# Patient Record
Sex: MALE | Race: WHITE | Employment: OTHER | URBAN - METROPOLITAN AREA
[De-identification: names, ages, dates, MRNs, and addresses within clinical notes are randomized per-mention and may not be internally consistent; named-entity substitution may affect disease eponyms.]

---

## 2019-09-19 ENCOUNTER — HOSPITAL ENCOUNTER (INPATIENT)
Age: 49
LOS: 2 days | Discharge: HOME | End: 2019-09-21
Attending: EMERGENCY MEDICINE | Admitting: INTERNAL MEDICINE
Payer: COMMERCIAL

## 2019-09-19 DIAGNOSIS — F10.930 ALCOHOL WITHDRAWAL SYNDROME WITHOUT COMPLICATION (HCC): Primary | ICD-10-CM

## 2019-09-19 PROBLEM — F10.939 ALCOHOL WITHDRAWAL (HCC): Status: ACTIVE | Noted: 2019-09-19

## 2019-09-19 PROBLEM — Z72.0 TOBACCO USE: Status: ACTIVE | Noted: 2019-09-19

## 2019-09-19 PROBLEM — F10.931: Status: ACTIVE | Noted: 2019-09-19

## 2019-09-19 LAB
A/G RATIO: 1.5 (ref 1.1–2.2)
ALBUMIN SERPL-MCNC: 4.8 G/DL (ref 3.4–5)
ALP BLD-CCNC: 104 U/L (ref 40–129)
ALT SERPL-CCNC: 155 U/L (ref 10–40)
AMORPHOUS: ABNORMAL /HPF
ANION GAP SERPL CALCULATED.3IONS-SCNC: 14 MMOL/L (ref 3–16)
AST SERPL-CCNC: 170 U/L (ref 15–37)
BACTERIA: ABNORMAL /HPF
BASOPHILS ABSOLUTE: 0.1 K/UL (ref 0–0.2)
BASOPHILS RELATIVE PERCENT: 0.5 %
BILIRUB SERPL-MCNC: 0.3 MG/DL (ref 0–1)
BILIRUBIN URINE: NEGATIVE
BLOOD, URINE: ABNORMAL
BUN BLDV-MCNC: 15 MG/DL (ref 7–20)
CALCIUM SERPL-MCNC: 9.9 MG/DL (ref 8.3–10.6)
CHLORIDE BLD-SCNC: 97 MMOL/L (ref 99–110)
CLARITY: CLEAR
CO2: 25 MMOL/L (ref 21–32)
COLOR: YELLOW
CREAT SERPL-MCNC: <0.5 MG/DL (ref 0.9–1.3)
EOSINOPHILS ABSOLUTE: 0 K/UL (ref 0–0.6)
EOSINOPHILS RELATIVE PERCENT: 0.3 %
ETHANOL: NORMAL MG/DL (ref 0–0.08)
GFR AFRICAN AMERICAN: >60
GFR NON-AFRICAN AMERICAN: >60
GLOBULIN: 3.2 G/DL
GLUCOSE BLD-MCNC: 104 MG/DL (ref 70–99)
GLUCOSE URINE: NEGATIVE MG/DL
HCT VFR BLD CALC: 44.8 % (ref 40.5–52.5)
HEMOGLOBIN: 15.5 G/DL (ref 13.5–17.5)
KETONES, URINE: NEGATIVE MG/DL
LEUKOCYTE ESTERASE, URINE: NEGATIVE
LIPASE: 55 U/L (ref 13–60)
LYMPHOCYTES ABSOLUTE: 0.8 K/UL (ref 1–5.1)
LYMPHOCYTES RELATIVE PERCENT: 7.7 %
MCH RBC QN AUTO: 33.6 PG (ref 26–34)
MCHC RBC AUTO-ENTMCNC: 34.7 G/DL (ref 31–36)
MCV RBC AUTO: 97.1 FL (ref 80–100)
MICROSCOPIC EXAMINATION: YES
MONOCYTES ABSOLUTE: 1.4 K/UL (ref 0–1.3)
MONOCYTES RELATIVE PERCENT: 14.1 %
NEUTROPHILS ABSOLUTE: 7.6 K/UL (ref 1.7–7.7)
NEUTROPHILS RELATIVE PERCENT: 77.4 %
NITRITE, URINE: NEGATIVE
PDW BLD-RTO: 13.7 % (ref 12.4–15.4)
PH UA: 8.5 (ref 5–8)
PLATELET # BLD: 151 K/UL (ref 135–450)
PMV BLD AUTO: 7.7 FL (ref 5–10.5)
POTASSIUM SERPL-SCNC: 3.7 MMOL/L (ref 3.5–5.1)
PROTEIN UA: 100 MG/DL
RBC # BLD: 4.61 M/UL (ref 4.2–5.9)
RBC UA: ABNORMAL /HPF (ref 0–2)
SODIUM BLD-SCNC: 136 MMOL/L (ref 136–145)
SPECIFIC GRAVITY UA: 1.01 (ref 1–1.03)
TOTAL PROTEIN: 8 G/DL (ref 6.4–8.2)
URINE TYPE: ABNORMAL
UROBILINOGEN, URINE: 0.2 E.U./DL
WBC # BLD: 9.9 K/UL (ref 4–11)
WBC UA: ABNORMAL /HPF (ref 0–5)

## 2019-09-19 PROCEDURE — 2580000003 HC RX 258: Performed by: NURSE PRACTITIONER

## 2019-09-19 PROCEDURE — 96374 THER/PROPH/DIAG INJ IV PUSH: CPT

## 2019-09-19 PROCEDURE — 85025 COMPLETE CBC W/AUTO DIFF WBC: CPT

## 2019-09-19 PROCEDURE — 96361 HYDRATE IV INFUSION ADD-ON: CPT

## 2019-09-19 PROCEDURE — 1200000000 HC SEMI PRIVATE

## 2019-09-19 PROCEDURE — 93005 ELECTROCARDIOGRAM TRACING: CPT | Performed by: EMERGENCY MEDICINE

## 2019-09-19 PROCEDURE — 96376 TX/PRO/DX INJ SAME DRUG ADON: CPT

## 2019-09-19 PROCEDURE — 83690 ASSAY OF LIPASE: CPT

## 2019-09-19 PROCEDURE — 99291 CRITICAL CARE FIRST HOUR: CPT

## 2019-09-19 PROCEDURE — 80053 COMPREHEN METABOLIC PANEL: CPT

## 2019-09-19 PROCEDURE — 2580000003 HC RX 258: Performed by: EMERGENCY MEDICINE

## 2019-09-19 PROCEDURE — 6370000000 HC RX 637 (ALT 250 FOR IP): Performed by: NURSE PRACTITIONER

## 2019-09-19 PROCEDURE — 2500000003 HC RX 250 WO HCPCS: Performed by: EMERGENCY MEDICINE

## 2019-09-19 PROCEDURE — 81001 URINALYSIS AUTO W/SCOPE: CPT

## 2019-09-19 PROCEDURE — 96375 TX/PRO/DX INJ NEW DRUG ADDON: CPT

## 2019-09-19 PROCEDURE — 6360000002 HC RX W HCPCS: Performed by: EMERGENCY MEDICINE

## 2019-09-19 PROCEDURE — G0480 DRUG TEST DEF 1-7 CLASSES: HCPCS

## 2019-09-19 RX ORDER — LORAZEPAM 1 MG/1
2 TABLET ORAL
Status: DISCONTINUED | OUTPATIENT
Start: 2019-09-19 | End: 2019-09-21 | Stop reason: HOSPADM

## 2019-09-19 RX ORDER — LORAZEPAM 1 MG/1
3 TABLET ORAL
Status: DISCONTINUED | OUTPATIENT
Start: 2019-09-19 | End: 2019-09-21 | Stop reason: HOSPADM

## 2019-09-19 RX ORDER — LORAZEPAM 2 MG/ML
3 INJECTION INTRAMUSCULAR
Status: DISCONTINUED | OUTPATIENT
Start: 2019-09-19 | End: 2019-09-21 | Stop reason: HOSPADM

## 2019-09-19 RX ORDER — SODIUM CHLORIDE 0.9 % (FLUSH) 0.9 %
10 SYRINGE (ML) INJECTION EVERY 12 HOURS SCHEDULED
Status: DISCONTINUED | OUTPATIENT
Start: 2019-09-19 | End: 2019-09-21 | Stop reason: HOSPADM

## 2019-09-19 RX ORDER — LORAZEPAM 1 MG/1
1 TABLET ORAL
Status: DISCONTINUED | OUTPATIENT
Start: 2019-09-19 | End: 2019-09-21 | Stop reason: HOSPADM

## 2019-09-19 RX ORDER — CHLORDIAZEPOXIDE HYDROCHLORIDE 25 MG/1
25 CAPSULE, GELATIN COATED ORAL 3 TIMES DAILY
Status: DISCONTINUED | OUTPATIENT
Start: 2019-09-19 | End: 2019-09-21 | Stop reason: HOSPADM

## 2019-09-19 RX ORDER — NICOTINE 21 MG/24HR
1 PATCH, TRANSDERMAL 24 HOURS TRANSDERMAL DAILY
Status: DISCONTINUED | OUTPATIENT
Start: 2019-09-19 | End: 2019-09-21 | Stop reason: HOSPADM

## 2019-09-19 RX ORDER — CHOLECALCIFEROL (VITAMIN D3) 125 MCG
500 CAPSULE ORAL DAILY
Status: DISCONTINUED | OUTPATIENT
Start: 2019-09-20 | End: 2019-09-21 | Stop reason: HOSPADM

## 2019-09-19 RX ORDER — SODIUM CHLORIDE 0.9 % (FLUSH) 0.9 %
10 SYRINGE (ML) INJECTION PRN
Status: DISCONTINUED | OUTPATIENT
Start: 2019-09-19 | End: 2019-09-21 | Stop reason: HOSPADM

## 2019-09-19 RX ORDER — SODIUM CHLORIDE 9 MG/ML
INJECTION, SOLUTION INTRAVENOUS CONTINUOUS
Status: DISCONTINUED | OUTPATIENT
Start: 2019-09-19 | End: 2019-09-21 | Stop reason: HOSPADM

## 2019-09-19 RX ORDER — LORAZEPAM 2 MG/ML
2 INJECTION INTRAMUSCULAR
Status: DISCONTINUED | OUTPATIENT
Start: 2019-09-19 | End: 2019-09-21 | Stop reason: HOSPADM

## 2019-09-19 RX ORDER — LORAZEPAM 2 MG/ML
4 INJECTION INTRAMUSCULAR
Status: DISCONTINUED | OUTPATIENT
Start: 2019-09-19 | End: 2019-09-21 | Stop reason: HOSPADM

## 2019-09-19 RX ORDER — LORAZEPAM 1 MG/1
4 TABLET ORAL
Status: DISCONTINUED | OUTPATIENT
Start: 2019-09-19 | End: 2019-09-21 | Stop reason: HOSPADM

## 2019-09-19 RX ORDER — NICOTINE 21 MG/24HR
1 PATCH, TRANSDERMAL 24 HOURS TRANSDERMAL DAILY
Status: DISCONTINUED | OUTPATIENT
Start: 2019-09-20 | End: 2019-09-19

## 2019-09-19 RX ORDER — ACETAMINOPHEN 325 MG/1
650 TABLET ORAL EVERY 4 HOURS PRN
Status: DISCONTINUED | OUTPATIENT
Start: 2019-09-19 | End: 2019-09-21 | Stop reason: HOSPADM

## 2019-09-19 RX ORDER — FOLIC ACID 1 MG/1
1 TABLET ORAL DAILY
Status: DISCONTINUED | OUTPATIENT
Start: 2019-09-20 | End: 2019-09-21 | Stop reason: HOSPADM

## 2019-09-19 RX ORDER — LORAZEPAM 2 MG/ML
1 INJECTION INTRAMUSCULAR
Status: DISCONTINUED | OUTPATIENT
Start: 2019-09-19 | End: 2019-09-21 | Stop reason: HOSPADM

## 2019-09-19 RX ORDER — ONDANSETRON 2 MG/ML
4 INJECTION INTRAMUSCULAR; INTRAVENOUS ONCE
Status: COMPLETED | OUTPATIENT
Start: 2019-09-19 | End: 2019-09-19

## 2019-09-19 RX ORDER — M-VIT,TX,IRON,MINS/CALC/FOLIC 27MG-0.4MG
1 TABLET ORAL DAILY
Status: DISCONTINUED | OUTPATIENT
Start: 2019-09-20 | End: 2019-09-21 | Stop reason: HOSPADM

## 2019-09-19 RX ORDER — ONDANSETRON 2 MG/ML
4 INJECTION INTRAMUSCULAR; INTRAVENOUS EVERY 6 HOURS PRN
Status: DISCONTINUED | OUTPATIENT
Start: 2019-09-19 | End: 2019-09-21 | Stop reason: HOSPADM

## 2019-09-19 RX ORDER — 0.9 % SODIUM CHLORIDE 0.9 %
500 INTRAVENOUS SOLUTION INTRAVENOUS ONCE
Status: COMPLETED | OUTPATIENT
Start: 2019-09-19 | End: 2019-09-19

## 2019-09-19 RX ADMIN — CHLORDIAZEPOXIDE HYDROCHLORIDE 25 MG: 25 CAPSULE ORAL at 22:06

## 2019-09-19 RX ADMIN — NICOTINE POLACRILEX 4 MG: 4 GUM, CHEWING BUCCAL at 22:45

## 2019-09-19 RX ADMIN — Medication 10 ML: at 22:06

## 2019-09-19 RX ADMIN — ONDANSETRON 4 MG: 2 INJECTION INTRAMUSCULAR; INTRAVENOUS at 17:40

## 2019-09-19 RX ADMIN — FOLIC ACID: 5 INJECTION, SOLUTION INTRAMUSCULAR; INTRAVENOUS; SUBCUTANEOUS at 18:47

## 2019-09-19 RX ADMIN — SODIUM CHLORIDE: 9 INJECTION, SOLUTION INTRAVENOUS at 22:06

## 2019-09-19 RX ADMIN — SODIUM CHLORIDE 500 ML: 9 INJECTION, SOLUTION INTRAVENOUS at 17:39

## 2019-09-19 RX ADMIN — LORAZEPAM 1 MG: 2 INJECTION INTRAMUSCULAR; INTRAVENOUS at 18:52

## 2019-09-19 RX ADMIN — LORAZEPAM 4 MG: 2 INJECTION INTRAMUSCULAR; INTRAVENOUS at 17:39

## 2019-09-19 ASSESSMENT — PATIENT HEALTH QUESTIONNAIRE - PHQ9: SUM OF ALL RESPONSES TO PHQ QUESTIONS 1-9: 22

## 2019-09-19 ASSESSMENT — PAIN SCALES - GENERAL
PAINLEVEL_OUTOF10: 3
PAINLEVEL_OUTOF10: 4

## 2019-09-19 ASSESSMENT — PAIN DESCRIPTION - DESCRIPTORS: DESCRIPTORS: CONSTANT;NAGGING;STABBING

## 2019-09-19 ASSESSMENT — ENCOUNTER SYMPTOMS
BACK PAIN: 1
VOMITING: 1
ABDOMINAL PAIN: 0
NAUSEA: 1
SHORTNESS OF BREATH: 0
COLOR CHANGE: 0

## 2019-09-19 ASSESSMENT — PAIN DESCRIPTION - ORIENTATION: ORIENTATION: LOWER;LEFT

## 2019-09-19 ASSESSMENT — PAIN DESCRIPTION - LOCATION: LOCATION: NECK;BACK;LEG

## 2019-09-19 ASSESSMENT — PAIN DESCRIPTION - PAIN TYPE: TYPE: CHRONIC PAIN

## 2019-09-19 NOTE — H&P
Recent Labs     09/19/19  1654      K 3.7   CL 97*   CO2 25   BUN 15   CREATININE <0.5*   CALCIUM 9.9     Recent Labs     09/19/19  1654   *   *   BILITOT 0.3   ALKPHOS 104     Urinalysis:      Lab Results   Component Value Date    NITRU Negative 09/19/2019    WBCUA 0-2 09/19/2019    BACTERIA 1+ 09/19/2019    RBCUA None seen 09/19/2019    BLOODU TRACE-INTACT 09/19/2019    SPECGRAV 1.015 09/19/2019    GLUCOSEU Negative 09/19/2019     Radiology:     CXR: I have reviewed the CXR with the following interpretation: N/A    EKG:  I have reviewed the EKG with the following interpretation: Sinus tachycardia, 156 with frequent Premature ventricular complexes. otherwise normal ECG. When compared with ECG of 16-DEC-2013 13:02, Premature ventricular complexes are now Present. Vent.  rate has increased BY  36 BPM    No orders to display     ASSESSMENT:    Active Hospital Problems    Diagnosis Date Noted    Tobacco use [Z72.0] 09/19/2019    Alcohol withdrawal ([address] [F10.239] 09/19/2019     PLAN:    Elective alcohol withdrawal in patient with known alcohol abuse  -Patient states he drinks  -Last drink was  -Alcohol level nondetected  -Received Ativan in ED  -Received banana bag in ED  -500 mL normal saline bolus given in ED  -CIWA protocol initiated in ED and continued  -Fall precautions  -Seizure precautions  -Telemetry monitoring  -Multivitamin, folic acid, and A82 daily  -Maintenance IV fluid  -Case management consult- alcohol rehab-thank you  -Librium initiated    Tobacco use  -Tobacco cessation  -Nicotine patch if needed    Acute transaminitis   -Pt with elevated /  -Secondary to alcohol use  -will trend  -hepatitis panel is pending    DVT Prophylaxis: Lovenox    Diet: No diet orders on file     Code Status: No Order    PT/OT Eval Status: No indication for need at this time    Dispo -2 to 3 days pending clinical improvement     Cathleen Lazo, APRN - CNP    Thank you No primary care

## 2019-09-19 NOTE — ED PROVIDER NOTES
[address] ENCOUNTER        Pt Name: Melba Draper  MRN: [id number]  [place] 1970  Date of evaluation: 9/19/2019  Provider: Malcolm Garcia MD  PCP: No primary care provider on file. CHIEF COMPLAINT       Chief Complaint   Patient presents with    Alcohol Problem     Patient states hes in Alcohol withdrawl, went to the recovery center today for alcohol assesment and was instructed to come to the ED for detox       HISTORY OFPRESENT ILLNESS   (Location/Symptom, Timing/Onset, Context/Setting, Quality, Duration, Modifying Factors,Severity)  Note limiting factors. Melba Draper is a 52 y.o. male presenting today due to concern for being an alcoholic but wanting to get help. His wife and daughter convinced him to come to the emergency department today after he initially went to outpatient rehabilitation but was told he was unsafe to be seen as an outpatient at this time. He had a small amount of alcohol this morning but states the last time he drank his normal amount was yesterday. He has went through serious alcohol withdrawal in the past.  No seizure-like activity. He denies any chest pain or abdominal pain. He is very tremulous with vomiting at home. He denies any hallucinations. No falls or trauma. He denies any headache or neck pain. He has chronic left leg sciatica with numbness to the left leg over the last month worsening but no new changes today. REVIEW OF SYSTEMS    (2-9 systems for level 4, 10 or more for level 5)     Review of Systems   Constitutional: Positive for fatigue. Negative for chills and fever. HENT: Negative for congestion. Respiratory: Negative for shortness of breath. Cardiovascular: Negative for chest pain. Gastrointestinal: Positive for nausea and vomiting. Negative for abdominal pain. Genitourinary: Negative for flank pain. Musculoskeletal: Positive for back pain (chronic low back).    Skin:

## 2019-09-20 LAB
ALBUMIN SERPL-MCNC: 3.9 G/DL (ref 3.4–5)
ALP BLD-CCNC: 85 U/L (ref 40–129)
ALT SERPL-CCNC: 102 U/L (ref 10–40)
ANION GAP SERPL CALCULATED.3IONS-SCNC: 10 MMOL/L (ref 3–16)
AST SERPL-CCNC: 101 U/L (ref 15–37)
BASOPHILS ABSOLUTE: 0 K/UL (ref 0–0.2)
BASOPHILS RELATIVE PERCENT: 0.4 %
BILIRUB SERPL-MCNC: 0.4 MG/DL (ref 0–1)
BILIRUBIN DIRECT: <0.2 MG/DL (ref 0–0.3)
BILIRUBIN, INDIRECT: ABNORMAL MG/DL (ref 0–1)
BUN BLDV-MCNC: 13 MG/DL (ref 7–20)
CALCIUM SERPL-MCNC: 9.1 MG/DL (ref 8.3–10.6)
CHLORIDE BLD-SCNC: 104 MMOL/L (ref 99–110)
CO2: 25 MMOL/L (ref 21–32)
CREAT SERPL-MCNC: 0.6 MG/DL (ref 0.9–1.3)
EKG ATRIAL RATE: 156 BPM
EKG DIAGNOSIS: NORMAL
EKG P AXIS: 70 DEGREES
EKG P-R INTERVAL: 140 MS
EKG Q-T INTERVAL: 366 MS
EKG QRS DURATION: 94 MS
EKG QTC CALCULATION (BAZETT): 495 MS
EKG R AXIS: 71 DEGREES
EKG T AXIS: 62 DEGREES
EKG VENTRICULAR RATE: 110 BPM
EOSINOPHILS ABSOLUTE: 0.1 K/UL (ref 0–0.6)
EOSINOPHILS RELATIVE PERCENT: 1.6 %
GFR AFRICAN AMERICAN: >60
GFR NON-AFRICAN AMERICAN: >60
GLUCOSE BLD-MCNC: 91 MG/DL (ref 70–99)
HAV IGM SER IA-ACNC: NORMAL
HCT VFR BLD CALC: 39.1 % (ref 40.5–52.5)
HEMOGLOBIN: 13.4 G/DL (ref 13.5–17.5)
HEPATITIS B CORE IGM ANTIBODY: NORMAL
HEPATITIS B SURFACE ANTIGEN INTERPRETATION: NORMAL
HEPATITIS C ANTIBODY INTERPRETATION: NORMAL
LYMPHOCYTES ABSOLUTE: 1.1 K/UL (ref 1–5.1)
LYMPHOCYTES RELATIVE PERCENT: 14.5 %
MCH RBC QN AUTO: 33.8 PG (ref 26–34)
MCHC RBC AUTO-ENTMCNC: 34.3 G/DL (ref 31–36)
MCV RBC AUTO: 98.3 FL (ref 80–100)
MONOCYTES ABSOLUTE: 1.1 K/UL (ref 0–1.3)
MONOCYTES RELATIVE PERCENT: 15 %
NEUTROPHILS ABSOLUTE: 5.2 K/UL (ref 1.7–7.7)
NEUTROPHILS RELATIVE PERCENT: 68.5 %
PDW BLD-RTO: 13.4 % (ref 12.4–15.4)
PLATELET # BLD: 123 K/UL (ref 135–450)
PMV BLD AUTO: 8.4 FL (ref 5–10.5)
POTASSIUM REFLEX MAGNESIUM: 3.9 MMOL/L (ref 3.5–5.1)
RBC # BLD: 3.97 M/UL (ref 4.2–5.9)
SODIUM BLD-SCNC: 139 MMOL/L (ref 136–145)
TOTAL PROTEIN: 6.4 G/DL (ref 6.4–8.2)
WBC # BLD: 7.6 K/UL (ref 4–11)

## 2019-09-20 PROCEDURE — 80076 HEPATIC FUNCTION PANEL: CPT

## 2019-09-20 PROCEDURE — 6360000002 HC RX W HCPCS: Performed by: EMERGENCY MEDICINE

## 2019-09-20 PROCEDURE — 93010 ELECTROCARDIOGRAM REPORT: CPT | Performed by: INTERNAL MEDICINE

## 2019-09-20 PROCEDURE — 85025 COMPLETE CBC W/AUTO DIFF WBC: CPT

## 2019-09-20 PROCEDURE — 36415 COLL VENOUS BLD VENIPUNCTURE: CPT

## 2019-09-20 PROCEDURE — 2580000003 HC RX 258: Performed by: NURSE PRACTITIONER

## 2019-09-20 PROCEDURE — 6360000002 HC RX W HCPCS: Performed by: NURSE PRACTITIONER

## 2019-09-20 PROCEDURE — 6370000000 HC RX 637 (ALT 250 FOR IP): Performed by: NURSE PRACTITIONER

## 2019-09-20 PROCEDURE — 2500000003 HC RX 250 WO HCPCS: Performed by: EMERGENCY MEDICINE

## 2019-09-20 PROCEDURE — 1200000000 HC SEMI PRIVATE

## 2019-09-20 PROCEDURE — 80074 ACUTE HEPATITIS PANEL: CPT

## 2019-09-20 PROCEDURE — 2580000003 HC RX 258: Performed by: EMERGENCY MEDICINE

## 2019-09-20 PROCEDURE — 80048 BASIC METABOLIC PNL TOTAL CA: CPT

## 2019-09-20 RX ADMIN — CHLORDIAZEPOXIDE HYDROCHLORIDE 25 MG: 25 CAPSULE ORAL at 15:19

## 2019-09-20 RX ADMIN — FOLIC ACID 1 MG: 1 TABLET ORAL at 09:07

## 2019-09-20 RX ADMIN — CHLORDIAZEPOXIDE HYDROCHLORIDE 25 MG: 25 CAPSULE ORAL at 20:45

## 2019-09-20 RX ADMIN — CYANOCOBALAMIN TAB 500 MCG 500 MCG: 500 TAB at 09:07

## 2019-09-20 RX ADMIN — ENOXAPARIN SODIUM 40 MG: 40 INJECTION SUBCUTANEOUS at 09:07

## 2019-09-20 RX ADMIN — CHLORDIAZEPOXIDE HYDROCHLORIDE 25 MG: 25 CAPSULE ORAL at 09:06

## 2019-09-20 RX ADMIN — FOLIC ACID: 5 INJECTION, SOLUTION INTRAMUSCULAR; INTRAVENOUS; SUBCUTANEOUS at 10:11

## 2019-09-20 RX ADMIN — Medication 1 TABLET: at 09:06

## 2019-09-20 RX ADMIN — LORAZEPAM 1 MG: 1 TABLET ORAL at 02:09

## 2019-09-20 RX ADMIN — Medication 10 ML: at 09:07

## 2019-09-20 NOTE — PROGRESS NOTES
Pt admitted to  [facility] with wife at bedside in stable condition via wheelchair. Pt has very slight h/a, slight tremor that can be seen, denies n/v, slightly sweating on nose, denies any type of hallucinations. Scoring 6 on CIWA. Oriented to room, call light and POC. Bed locked and in lowest position. Call light and bedside table within reach. Will continue to monitor.

## 2019-09-21 VITALS
HEIGHT: 75 IN | RESPIRATION RATE: 16 BRPM | BODY MASS INDEX: 18.5 KG/M2 | DIASTOLIC BLOOD PRESSURE: 93 MMHG | TEMPERATURE: 97.6 F | HEART RATE: 76 BPM | WEIGHT: 148.8 LBS | SYSTOLIC BLOOD PRESSURE: 158 MMHG | OXYGEN SATURATION: 96 %

## 2019-09-21 LAB
ALBUMIN SERPL-MCNC: 3.9 G/DL (ref 3.4–5)
ALP BLD-CCNC: 82 U/L (ref 40–129)
ALT SERPL-CCNC: 85 U/L (ref 10–40)
AST SERPL-CCNC: 84 U/L (ref 15–37)
BILIRUB SERPL-MCNC: 0.4 MG/DL (ref 0–1)
BILIRUBIN DIRECT: <0.2 MG/DL (ref 0–0.3)
BILIRUBIN, INDIRECT: ABNORMAL MG/DL (ref 0–1)
TOTAL PROTEIN: 6.5 G/DL (ref 6.4–8.2)

## 2019-09-21 PROCEDURE — 6360000002 HC RX W HCPCS: Performed by: EMERGENCY MEDICINE

## 2019-09-21 PROCEDURE — 2580000003 HC RX 258: Performed by: EMERGENCY MEDICINE

## 2019-09-21 PROCEDURE — 36415 COLL VENOUS BLD VENIPUNCTURE: CPT

## 2019-09-21 PROCEDURE — 2500000003 HC RX 250 WO HCPCS: Performed by: EMERGENCY MEDICINE

## 2019-09-21 PROCEDURE — 6370000000 HC RX 637 (ALT 250 FOR IP): Performed by: NURSE PRACTITIONER

## 2019-09-21 PROCEDURE — 6360000002 HC RX W HCPCS: Performed by: NURSE PRACTITIONER

## 2019-09-21 PROCEDURE — 80076 HEPATIC FUNCTION PANEL: CPT

## 2019-09-21 RX ORDER — CHLORDIAZEPOXIDE HYDROCHLORIDE 25 MG/1
25 CAPSULE, GELATIN COATED ORAL 3 TIMES DAILY PRN
Qty: 15 CAPSULE | Refills: 0 | Status: SHIPPED | OUTPATIENT
Start: 2019-09-21 | End: 2019-09-26

## 2019-09-21 RX ORDER — FOLIC ACID 1 MG/1
1 TABLET ORAL DAILY
Qty: 30 TABLET | Refills: 3 | Status: SHIPPED | OUTPATIENT
Start: 2019-09-22 | End: 2020-02-07

## 2019-09-21 RX ORDER — THIAMINE MONONITRATE (VIT B1) 100 MG
100 TABLET ORAL DAILY
Qty: 30 TABLET | Refills: 3 | Status: SHIPPED | OUTPATIENT
Start: 2019-09-21 | End: 2020-02-07

## 2019-09-21 RX ADMIN — Medication 1 TABLET: at 09:18

## 2019-09-21 RX ADMIN — LORAZEPAM 1 MG: 1 TABLET ORAL at 01:59

## 2019-09-21 RX ADMIN — CHLORDIAZEPOXIDE HYDROCHLORIDE 25 MG: 25 CAPSULE ORAL at 09:18

## 2019-09-21 RX ADMIN — ENOXAPARIN SODIUM 40 MG: 40 INJECTION SUBCUTANEOUS at 09:19

## 2019-09-21 RX ADMIN — FOLIC ACID 1 MG: 1 TABLET ORAL at 09:18

## 2019-09-21 RX ADMIN — FOLIC ACID: 5 INJECTION, SOLUTION INTRAMUSCULAR; INTRAVENOUS; SUBCUTANEOUS at 09:18

## 2019-09-21 RX ADMIN — CYANOCOBALAMIN TAB 500 MCG 500 MCG: 500 TAB at 09:18

## 2019-09-21 NOTE — PROGRESS NOTES
Pt D/C to home per order. PIV removed. D/C instructions reviewed. Verbalized understanding. Pt taken off floor via wheelchair in stable condition.

## 2020-02-07 ENCOUNTER — OFFICE VISIT (OUTPATIENT)
Dept: ORTHOPEDIC SURGERY | Age: 50
End: 2020-02-07
Payer: COMMERCIAL

## 2020-02-07 VITALS
HEART RATE: 82 BPM | SYSTOLIC BLOOD PRESSURE: 144 MMHG | HEIGHT: 75 IN | DIASTOLIC BLOOD PRESSURE: 88 MMHG | WEIGHT: 148.81 LBS | BODY MASS INDEX: 18.5 KG/M2

## 2020-02-07 PROCEDURE — 4004F PT TOBACCO SCREEN RCVD TLK: CPT | Performed by: PHYSICAL MEDICINE & REHABILITATION

## 2020-02-07 PROCEDURE — G8427 DOCREV CUR MEDS BY ELIG CLIN: HCPCS | Performed by: PHYSICAL MEDICINE & REHABILITATION

## 2020-02-07 PROCEDURE — 3017F COLORECTAL CA SCREEN DOC REV: CPT | Performed by: PHYSICAL MEDICINE & REHABILITATION

## 2020-02-07 PROCEDURE — G8420 CALC BMI NORM PARAMETERS: HCPCS | Performed by: PHYSICAL MEDICINE & REHABILITATION

## 2020-02-07 PROCEDURE — 99204 OFFICE O/P NEW MOD 45 MIN: CPT | Performed by: PHYSICAL MEDICINE & REHABILITATION

## 2020-02-07 PROCEDURE — G8484 FLU IMMUNIZE NO ADMIN: HCPCS | Performed by: PHYSICAL MEDICINE & REHABILITATION

## 2020-02-07 RX ORDER — PREDNISONE 10 MG/1
TABLET ORAL
Qty: 26 TABLET | Refills: 0 | Status: SHIPPED | OUTPATIENT
Start: 2020-02-07 | End: 2020-06-27

## 2020-02-07 NOTE — PROGRESS NOTES
Current Medications:   No current outpatient medications on file. Allergies:  Patient has no known allergies. Social History:    reports that he has been smoking. He has been smoking about 1.00 pack per day. He has never used smokeless tobacco. He reports current alcohol use of about 14.0 standard drinks of alcohol per week. He reports current drug use. Drug: Marijuana. Family History:   No family history on file. REVIEW OF SYSTEMS: Full ROS noted & scanned   CONSTITUTIONAL: Denies unexplained weight loss, fevers, chills or fatigue  NEUROLOGICAL: Denies unsteady gait or progressive weakness  MUSCULOSKELETAL: Denies joint swelling or redness  PSYCHOLOGICAL: Denies anxiety, depression   SKIN: Denies skin changes, delayed healing, rash, itching   HEMATOLOGIC: Denies easy bleeding or bruising  ENDOCRINE: Denies excessive thirst, urination, heat/cold  RESPIRATORY: Denies current dyspnea, cough  GI: Denies nausea, vomiting, diarrhea   : Denies bowel or bladder issues       PHYSICAL EXAM:    Vitals: Blood pressure (!) 144/88, pulse 82, height 6' 3\" (1.905 m), weight 148 lb 13 oz (67.5 kg). GENERAL EXAM:  · General Apparence: Patient is adequately groomed with no evidence of malnutrition. · Orientation: The patient is oriented to time, place and person. · Mood & Affect:The patient's mood and affect are appropriate   · Vascular: Examination reveals no swelling tenderness in upper or lower extremities. Good capillary refill  · Lymphatic: The lymphatic examination bilaterally reveals all areas to be without enlargement or induration  · Sensation: Sensation is intact without deficit  · Coordination/Balance: Good coordination     CERVICAL EXAMINATION:  · Inspection: Local inspection shows no step-off or bruising. Cervical alignment is normal.     · Palpation: No evidence of tenderness at the midline, and trapezius. Paraspinal tenderness is present. There is no step-off or paraspinal spasm.    · Range of

## 2020-02-12 ENCOUNTER — HOSPITAL ENCOUNTER (OUTPATIENT)
Dept: PHYSICAL THERAPY | Age: 50
Setting detail: THERAPIES SERIES
Discharge: HOME | End: 2020-02-12
Payer: COMMERCIAL

## 2020-02-19 ENCOUNTER — HOSPITAL ENCOUNTER (OUTPATIENT)
Dept: PHYSICAL THERAPY | Age: 50
Setting detail: THERAPIES SERIES
Discharge: HOME | End: 2020-02-19
Payer: COMMERCIAL

## 2020-02-19 PROCEDURE — G0283 ELEC STIM OTHER THAN WOUND: HCPCS

## 2020-02-19 PROCEDURE — 97161 PT EVAL LOW COMPLEX 20 MIN: CPT

## 2020-02-19 PROCEDURE — 97110 THERAPEUTIC EXERCISES: CPT

## 2020-02-19 NOTE — PLAN OF CARE
[facility], [address]  Phone: [phone]  Fax [phone]    Physical Therapy Certification    Dear Referring Practitioner: Dr. Angelika Flores,    We had the pleasure of evaluating the following patient for physical therapy services at [address] [place] [address]. A summary of our findings can be found in the initial assessment below. This includes our plan of care. If you have any questions or concerns regarding these findings, please do not hesitate to contact me at the office phone number checked above. Thank you for the referral.       Physician Signature:_______________________________Date:__________________  By signing above (or electronic signature), therapists plan is approved by physician    Patient: Alivia Jacobs   : 1970   MRN: [id number]  Referring Physician: Referring Practitioner: Dr. Angelika Flores      Evaluation Date: 2020      Medical Diagnosis Information:  Diagnosis: Spinal stenosis of lumbar region w/ neurogenic claudication (M48.062)   Treatment Diagnosis: Lumbar pain (M54.5)                                         Insurance information: PT Insurance Information: [employer]       Precautions/ Contra-indications: hx cervical pain  Latex Allergy:  [x]NO      []YES  Preferred Language for Healthcare:   [x]English       []other:    SUBJECTIVE: Patient stated complaint: Pt reports his back has been bothering him about 2-3 years with no specific BILLIE. Pt reports he gets numbness down both legs, stabbing pain in calves and left glutes. States he did just finish a steroid pack which helped with the pain. 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     Relevant Medical History: hx cervical pain  Functional Disability Index/G-Codes:   Modified Oswestry 36%    Height: 6'3 Weight: 155 lb  Pain Scale: 0-6/10  Easing factors: heat, steroid pack, sitting  Provocative factors: standing, walking, lifting, overhead movement, lifting    Type: []Constant   [x]Intermittent  [x]Radiating []Localized []other:     Numbness/Tingling: bilateral legs, worsened after lifting activities    Occupation/School: construction; enjoys walking, tennis and skiing    Living Status/Prior Level of Function: Independent with ADLs  OBJECTIVE:     ROM     LUMBAR FLEX 90 deg    LUMBAR EXT 20 deg    Sidebend To knee joint B    Rotation      LEFT RIGHT   HIP Flex     HIP Abd     HIP ER     HIP IR     Knee Flex     Knee ext     Hamstring FLEX ~60 deg SLR ~50 deg SLR   Piriformis                Strength  LEFT RIGHT   MfA     TrA     HIP Flexors 4/5 4+/5   HIP Abductors     HIP ER 4-/5 4/5   Hip IR 4-/5 4+/5   Knee EXT (quad) 4/5 4+/5   Knee Flex (HS) 4-/5 4+/5   Ankle DF     Ankle PF     Great Toe Ext       Reflexes/Sensation:    []Dermatomes/Myotomes intact    []UE Reflexes     []Normal []Hypo      []Hyper   [x]LE Reflexes     [x]Normal []Hypo      []Hyper   []Babinski/Clonus/Hoffmans:    []Other:    Joint mobility:   []Normal    [x]Hypo   []Hyper    Palpation: palpable increased tightness along L2-4 paraspinals on the L side. Stiff w/ PA mobs and sacral mobs    Functional Mobility/Transfers: no deficits    Posture: stands with knees flexed-reports he does this to be in a \"yoga ready posture\" to take pressure off of his back    Bandages/Dressings/Incisions: n/a    Gait: (include devices/WB status) no gait deviations    Orthopedic Special Tests: none                       [x] Patient history, allergies, meds reviewed. Medical chart reviewed. See intake form. Review Of Systems (ROS):  [x]Performed Review of systems (Integumentary, CardioPulmonary, Neurological) by intake and observation. Intake form has been scanned into medical record.  Patient has been instructed to contact their primary care dermatome dysfunction   []Signs/symptoms consistent with post-surgical status including: decreased ROM, strength and function. [x]signs/symptoms consistent with pathology which may benefit from Dry needling     []other:      Prognosis/Rehab Potential:      []Excellent   []Good    [x]Fair   []Poor    Tolerance of evaluation/treatment:    []Excellent   [x]Good    []Fair   []Poor  Physical Therapy Evaluation Complexity Justification  [x] A history of present problem with:  [] no personal factors and/or comorbidities that impact the plan of care;  [x]1-2 personal factors and/or comorbidities that impact the plan of care  []3 personal factors and/or comorbidities that impact the plan of care  [x] An examination of body systems using standardized tests and measures addressing any of the following: body structures and functions (impairments), activity limitations, and/or participation restrictions;:  [x] a total of 1-2 or more elements   [] a total of 3 or more elements   [] a total of 4 or more elements   [x] A clinical presentation with:  [] stable and/or uncomplicated characteristics   [x] evolving clinical presentation with changing characteristics  [] unstable and unpredictable characteristics;   [x] Clinical decision making of [x] low, [] moderate, [] high complexity using standardized patient assessment instrument and/or measurable assessment of functional outcome.     [x] EVAL (LOW) 58280 (typically 20 minutes face-to-face)  [] EVAL (MOD) 37822 (typically 30 minutes face-to-face)  [] EVAL (HIGH) 74769 (typically 45 minutes face-to-face)  [] RE-EVAL         PLAN: Begin PT focusing on: proximal hip mobilizations, LB mobs, LB core activation, proximal hip activation, and HEP    Frequency/Duration: 1-2 days per week for 6-8 Weeks:  Interventions:  [x]  Therapeutic exercise including: strength training, ROM, for LE, Glutes and core   [x]  NMR activation and proprioception for glutes , LE and Core   [x]  Manual therapy signed by:  Nita Ya, [facility] ,DPT [id number]

## 2020-02-19 NOTE — FLOWSHEET NOTE
[facility],  [address] [place]  Phone: [phone]  Fax [phone]    Physical Therapy Treatment Note/ Progress Report:           Date:  2020    Patient Name:  Marcial Morales  \"KARRIE\"  :  1970  MRN: [id number]  Restrictions/Precautions:    Medical/Treatment Diagnosis Information:  · Diagnosis: Spinal stenosis of lumbar region w/ neurogenic claudication (M48.062)  · Treatment Diagnosis: Lumbar pain (M95.8)  Insurance/Certification information:  PT Insurance Information: [facility]  Physician Information:  Referring Practitioner: Dr. Carlita Harris  Has the plan of care been signed (Y/N):        []  Yes  [x]  No     Date of Patient follow up with Physician:       Is this a Progress Report:     []  Yes  [x]  No        If Yes:  Date Range for reporting period:  Beginning  Ending    Progress report will be due (10 Rx or 30 days whichever is less):        Recertification will be due (POC Duration  / 90 days whichever is less): 20        Visit # Insurance Allowable Auth Required   1 30 [x]  Yes []  No   If >30 visits       Functional Scale: Modified Oswestry 36%    Date assessed:  20     Therapy Diagnosis/Practice Pattern:F      Number of Comorbidities:  []0     [x]1-2    []3+    Latex Allergy:  [x]NO      []YES  Preferred Language for Healthcare:   [x]English       []other:      Pain level: 1-6/10    SUBJECTIVE:  See eval    OBJECTIVE: See eval   Observation:    Test measurements:      RESTRICTIONS/PRECAUTIONS: hx cervical pain    Exercises/Interventions:     Therapeutic Ex (65244) Sets/sec Reps Notes/CUES   Fig 4 stretch B 30\" 3 HEp   HL TA  5\" 20x HEP, tactile cues, cues for syncing with breathing   LTR 5\" 10x R/L HEP   SLR flex B 1 10 Fatigue; HEP   HS stretch at stairs B 30\" 3 HEP   Standing hip ABD B 1 10 Cues for upright position and no trunk lean; HEP                           Pt ed 10'  HEP, POC, posture for standing, importance of core activation, heat, potential traction         Manual Intervention (16251)      STM lumbar paraspinals/glutes  Hip ER stretch B 4'  Tight L glutes                                 NMR re-education (48924)   CUES NEEDED                                                         Therapeutic Activity (69754)                                              Therapeutic Exercise and NMR EXR  [x] (53625) Provided verbal/tactile cueing for activities related to strengthening, flexibility, endurance, ROM  for improvements in proximal hip and core control with self care, mobility, lifting and ambulation.  [] (63789) Provided verbal/tactile cueing for activities related to improving balance, coordination, kinesthetic sense, posture, motor skill, proprioception  to assist with core control in self care, mobility, lifting, and ambulation.      Therapeutic Activities:    [] (08712 or 16610) Provided verbal/tactile cueing for activities related to improving balance, coordination, kinesthetic sense, posture, motor skill, proprioception and motor activation to allow for proper function  with self care and ADLs  [] (43774) Provided training and instruction to the patient for proper core and proximal hip recruitment and positioning with ambulation re-education     Home Exercise Program:    [x] (12654) Reviewed/Progressed HEP activities related to strengthening, flexibility, endurance, ROM of core, proximal hip and LE for functional self-care, mobility, lifting and ambulation   [] (72939) Reviewed/Progressed HEP activities related to improving balance, coordination, kinesthetic sense, posture, motor skill, proprioception of core, proximal hip and LE for self care, mobility, lifting, and ambulation      Manual Treatments:  PROM / STM / Oscillations-Mobs:  G-I, II, III, IV (PA's, Inf., Post.)  [x] (10610) Provided manual therapy to mobilize proximal hip and LS spine soft tissue/joints for the purpose of modulating pain, promoting relaxation,  increasing ROM, reducing/eliminating soft tissue swelling/inflammation/restriction, improving soft tissue extensibility and allowing for proper ROM for normal function with self care, mobility, lifting and ambulation. Modalities: PM/MHP in prone 15' along belt line      Charges:  Timed Code Treatment Minutes: 25   Total Treatment Minutes: 65     [x] EVAL (LOW) 10969 (typically 20 minutes face-to-face)  [] EVAL (MOD) 85735 (typically 30 minutes face-to-face)  [] EVAL (HIGH) 71639 (typically 45 minutes face-to-face)  [] RE-EVAL     [x] QM(01185) x 2    [] IONTO  [] NMR (94575) x     [] VASO  [] Manual (93612) x      [] Other:  [] TA x      [] Mech Traction (76123)  [] ES(attended) (44152)      [x] ES (un) (15859):     Goals:   Patient stated goal: \"Less pain, more mobility\"  [] Progressing: [] Met: [] Not Met: [] Adjusted    Therapist goals for Patient:   Short Term Goals: To be achieved in: 2 weeks  1. Independent in HEP and progression per patient tolerance, in order to prevent re-injury. [] Progressing: [] Met: [] Not Met: [] Adjusted  2. Patient will have a decrease in pain to facilitate improvement in movement, function, and ADLs as indicated by Functional Deficits. [] Progressing: [] Met: [] Not Met: [] Adjusted      Long Term Goals: To be achieved in: 8 weeks  1. Disability index score of 18% or less for the Modified Oswestry  to assist with reaching prior level of function. [] Progressing: [] Met: [] Not Met: [] Adjusted  2. Patient will demonstrate increased hamstring flexibility to ~80 deg SLR B to allow for proper joint functioning as indicated by patients Functional Deficits. [] Progressing: [] Met: [] Not Met: [] Adjusted  3. Patient will demonstrate an increase in Strength to 4+/5 hip flex on L and 4+/5 hip ABD B to allow for proper functional mobility as indicated by patients Functional Deficits. [] Progressing: [] Met: [] Not Met: [] Adjusted  4. Patient will return to standing and walking >2 hrs without increased symptoms or restriction. [] Progressing: [] Met: [] Not Met: [] Adjusted  5. Pt will return to recreational walking and work activities including heavy lifting without increased symptoms or restriction (patient specific functional goal)    [] Progressing: [] Met: [] Not Met: [] Adjusted    Progression Towards Functional goals:  [] Patient is progressing as expected towards functional goals listed. [] Progression is slowed due to complexities listed. [] Progression has been slowed due to co-morbidities. [x] Plan just implemented, too soon to assess goals progression  [] Other:     Overall Progression Towards Functional goals/ Treatment Progress Update:  [] Patient is progressing as expected towards functional goals listed. [] Progression is slowed due to complexities/Impairments listed. [] Progression has been slowed due to co-morbidities. [x] Plan just implemented, too soon to assess goals progression <30days   [] Goals require adjustment due to lack of progress  [] Patient is not progressing as expected and requires additional follow up with physician  [] Other    Prognosis for POC: [x] Good [] Fair  [] Poor      Patient requires continued skilled intervention: [x] Yes  [] No    Treatment/Activity Tolerance:  [x] Patient able to complete treatment  [] Patient limited by fatigue  [] Patient limited by pain    [] Patient limited by other medical complications  [] Other:     ASSESSMENT: See eval      PLAN: See eval  [] Continue per plan of care [] Alter current plan (see comments above)  [x] Plan of care initiated [] Hold pending MD visit [] Discharge      Electronically signed by:  Ival Lesch, PT,DPT [id number]    Note: If patient does not return for scheduled/ recommended follow up visits, this note will serve as a discharge from care along with most recent update on progress.

## 2020-02-21 ENCOUNTER — HOSPITAL ENCOUNTER (OUTPATIENT)
Dept: PHYSICAL THERAPY | Age: 50
Setting detail: THERAPIES SERIES
Discharge: HOME | End: 2020-02-21
Payer: COMMERCIAL

## 2020-02-21 PROCEDURE — G0283 ELEC STIM OTHER THAN WOUND: HCPCS

## 2020-02-21 PROCEDURE — 97110 THERAPEUTIC EXERCISES: CPT

## 2020-02-21 PROCEDURE — 97140 MANUAL THERAPY 1/> REGIONS: CPT

## 2020-02-21 NOTE — FLOWSHEET NOTE
[facility],  [address] [place]  Phone: [phone]  Fax [phone]    Physical Therapy Treatment Note/ Progress Report:           Date:  2020    Patient Name:  Anjel Goodman  \"KARRIE\"  :  1970  MRN: [id number]  Restrictions/Precautions:    Medical/Treatment Diagnosis Information:  · Diagnosis: Spinal stenosis of lumbar region w/ neurogenic claudication (M48.062)  · Treatment Diagnosis: Lumbar pain (H48.6)  Insurance/Certification information:  PT Insurance Information: [facility]  Physician Information:  Referring Practitioner: Dr. Chiquis Rutherford  Has the plan of care been signed (Y/N):        []  Yes  [x]  No     Date of Patient follow up with Physician:       Is this a Progress Report:     []  Yes  [x]  No        If Yes:  Date Range for reporting period:  Beginning  Ending    Progress report will be due (10 Rx or 30 days whichever is less): 93       Recertification will be due (POC Duration  / 90 days whichever is less): 20        Visit # Insurance Allowable Auth Required   2 30 [x]  Yes []  No   If >30 visits       Functional Scale: Modified Oswestry 36%    Date assessed:  20     Therapy Diagnosis/Practice Pattern:F      Number of Comorbidities:  []0     [x]1-2    []3+    Latex Allergy:  [x]NO      []YES  Preferred Language for Healthcare:   [x]English       []other:      Pain level: 1-6/10    SUBJECTIVE:  Pt reports he hasn't done the exercises yet because he had to work late yesterday.     OBJECTIVE: See eval   Observation:    Test measurements:      RESTRICTIONS/PRECAUTIONS: hx cervical pain    Exercises/Interventions:     Therapeutic Ex (69938) Sets/sec Reps Notes/CUES   Prone TA w/ glute set 5\" 20x    Fig 4 stretch B 30\" 3 HEp   HL TA  5\" 20x HEP, tactile cues, cues for syncing with breathing   HL TA w/ SLFO      LTR 5\" 10x R/L HEP   SLR flex B 1 10 Fatigue; HEP   HS stretch at stairs B 30\" 3 HEP   Standing hip ABD B 1 10 Cues for upright position and no trunk lean; HEP                                       Manual Intervention (60325)      STM lumbar paraspinals/glutes  TPR glutes/piriformis L  Hip ER stretch B, prone quad stretch   15'                                   NMR re-education (06120)   CUES NEEDED                                                         Therapeutic Activity (31796)                                              Therapeutic Exercise and NMR EXR  [x] (80653) Provided verbal/tactile cueing for activities related to strengthening, flexibility, endurance, ROM  for improvements in proximal hip and core control with self care, mobility, lifting and ambulation.  [] (40443) Provided verbal/tactile cueing for activities related to improving balance, coordination, kinesthetic sense, posture, motor skill, proprioception  to assist with core control in self care, mobility, lifting, and ambulation.      Therapeutic Activities:    [] (53575 or 73401) Provided verbal/tactile cueing for activities related to improving balance, coordination, kinesthetic sense, posture, motor skill, proprioception and motor activation to allow for proper function  with self care and ADLs  [] (82656) Provided training and instruction to the patient for proper core and proximal hip recruitment and positioning with ambulation re-education     Home Exercise Program:    [x] (82383) Reviewed/Progressed HEP activities related to strengthening, flexibility, endurance, ROM of core, proximal hip and LE for functional self-care, mobility, lifting and ambulation   [] (46948) Reviewed/Progressed HEP activities related to improving balance, coordination, kinesthetic sense, posture, motor skill, proprioception of core, proximal hip and LE for self care, mobility, lifting, and ambulation      Manual Treatments:  PROM / STM / Oscillations-Mobs:  G-I, II, III, IV (PA's, Inf., Post.)  [x] (20173) Provided manual therapy to mobilize Deficits. [] Progressing: [] Met: [] Not Met: [] Adjusted  4. Patient will return to standing and walking >2 hrs without increased symptoms or restriction. [] Progressing: [] Met: [] Not Met: [] Adjusted  5. Pt will return to recreational walking and work activities including heavy lifting without increased symptoms or restriction (patient specific functional goal)    [] Progressing: [] Met: [] Not Met: [] Adjusted    Progression Towards Functional goals:  [] Patient is progressing as expected towards functional goals listed. [] Progression is slowed due to complexities listed. [] Progression has been slowed due to co-morbidities. [x] Plan just implemented, too soon to assess goals progression  [] Other:     Overall Progression Towards Functional goals/ Treatment Progress Update:  [] Patient is progressing as expected towards functional goals listed. [] Progression is slowed due to complexities/Impairments listed. [] Progression has been slowed due to co-morbidities. [x] Plan just implemented, too soon to assess goals progression <30days   [] Goals require adjustment due to lack of progress  [] Patient is not progressing as expected and requires additional follow up with physician  [] Other    Prognosis for POC: [x] Good [] Fair  [] Poor      Patient requires continued skilled intervention: [x] Yes  [] No    Treatment/Activity Tolerance:  [x] Patient able to complete treatment  [] Patient limited by fatigue  [] Patient limited by pain    [] Patient limited by other medical complications  [] Other:     ASSESSMENT: Pt has not yet attempted HEP at home thus did not change anything for time being. Did well with exercises and improved ability to activate TA.       PLAN: Cont hip strength and core activation in various positions, potential traction  [x] Continue per plan of care [] Alter current plan (see comments above)  [] Plan of care initiated [] Hold pending MD visit [] Discharge      Electronically

## 2020-02-26 ENCOUNTER — HOSPITAL ENCOUNTER (OUTPATIENT)
Dept: PHYSICAL THERAPY | Age: 50
Setting detail: THERAPIES SERIES
Discharge: HOME | End: 2020-02-26
Payer: COMMERCIAL

## 2020-02-26 NOTE — FLOWSHEET NOTE
[facility],  [address]        Physical Therapy  Cancellation/No-show Note  Patient Name:  Esteban Fitch  :  1970   Date:  2020  Cancelled visits to date: 1  No-shows to date: 0    For today's appointment patient:  x Cancelled  ? Rescheduled appointment  ? No-show     Reason given by patient:  x Patient ill  ? Conflicting appointment  ? No transportation    ? Conflict with work  ? No reason given  ?   Other:     Comments:      Electronically signed by:  Melodie Mason, PT,DPT [id number]

## 2020-02-28 ENCOUNTER — HOSPITAL ENCOUNTER (OUTPATIENT)
Dept: PHYSICAL THERAPY | Age: 50
Setting detail: THERAPIES SERIES
Discharge: HOME | End: 2020-02-28
Payer: COMMERCIAL

## 2020-02-28 PROCEDURE — G0283 ELEC STIM OTHER THAN WOUND: HCPCS

## 2020-02-28 PROCEDURE — 97110 THERAPEUTIC EXERCISES: CPT

## 2020-02-28 PROCEDURE — 97140 MANUAL THERAPY 1/> REGIONS: CPT

## 2020-02-28 NOTE — FLOWSHEET NOTE
[facility], [address]  Phone: [phone]  Fax [phone]    Physical Therapy Treatment Note/ Progress Report:           Date:  2020    Patient Name:  Quique Pham  \"KARRIE\"  :  1970  MRN: [id number]  Restrictions/Precautions:    Medical/Treatment Diagnosis Information:  · Diagnosis: Spinal stenosis of lumbar region w/ neurogenic claudication (M48.062)  · Treatment Diagnosis: Lumbar pain (Z42.7)  Insurance/Certification information:  PT Insurance Information: [facility]  Physician Information:  Referring Practitioner: Dr. Byron Garnica  Has the plan of care been signed (Y/N):        []  Yes  [x]  No     Date of Patient follow up with Physician:       Is this a Progress Report:     []  Yes  [x]  No        If Yes:  Date Range for reporting period:  Beginning  Ending    Progress report will be due (10 Rx or 30 days whichever is less): 3/41/05       Recertification will be due (POC Duration  / 90 days whichever is less): 20        Visit # Insurance Allowable Auth Required   3 30 [x]  Yes []  No   If >30 visits       Functional Scale: Modified Oswestry 36%    Date assessed:  20     Therapy Diagnosis/Practice Pattern:F      Number of Comorbidities:  []0     [x]1-2    []3+    Latex Allergy:  [x]NO      []YES  Preferred Language for Healthcare:   [x]English       []other:      Pain level: 1-6/10    SUBJECTIVE:  Pt reports he was sick earlier this week. Has been feeling pretty good but stiff this morning.      OBJECTIVE: See eval   Observation:    Test measurements:      RESTRICTIONS/PRECAUTIONS: hx cervical pain    Exercises/Interventions:     Therapeutic Ex (51783) Sets/sec Reps Notes/CUES   Prone TA w/ glute set 5\" 20x    Supine piriformis stretch B 30\" 3    Fig 4 stretch B 30\" 3 HEp   SB roll in 5\" 20x    LTR on SB 5\" 10x R/L HEP   Bridge w/ ADD 5\" 2 x 10    SLR flex B 1 15 Fatigue; HEP   SLR abd B 1 10    HS stretch at stairs B 30\" 3 HEP                                       Manual Intervention (31035)      STM lumbar paraspinals/glutes  TPR glutes/piriformis L  Hip ER stretch B, prone quad stretch  Sacral mobs, PA mobs L spine   15'                                   NMR re-education (73408)   CUES NEEDED                                                         Therapeutic Activity (72655)                                              Therapeutic Exercise and NMR EXR  [x] (38375) Provided verbal/tactile cueing for activities related to strengthening, flexibility, endurance, ROM  for improvements in proximal hip and core control with self care, mobility, lifting and ambulation.  [] (41055) Provided verbal/tactile cueing for activities related to improving balance, coordination, kinesthetic sense, posture, motor skill, proprioception  to assist with core control in self care, mobility, lifting, and ambulation.      Therapeutic Activities:    [] (28007 or 54566) Provided verbal/tactile cueing for activities related to improving balance, coordination, kinesthetic sense, posture, motor skill, proprioception and motor activation to allow for proper function  with self care and ADLs  [] (65547) Provided training and instruction to the patient for proper core and proximal hip recruitment and positioning with ambulation re-education     Home Exercise Program:    [x] (54561) Reviewed/Progressed HEP activities related to strengthening, flexibility, endurance, ROM of core, proximal hip and LE for functional self-care, mobility, lifting and ambulation   [] (88333) Reviewed/Progressed HEP activities related to improving balance, coordination, kinesthetic sense, posture, motor skill, proprioception of core, proximal hip and LE for self care, mobility, lifting, and ambulation      Manual Treatments:  PROM / STM / Oscillations-Mobs:  G-I, II, III, IV (PA's, Inf., Post.)  [x] (24866) Provided manual therapy to mobilize proximal hip and LS spine soft tissue/joints for the purpose of modulating pain, promoting relaxation,  increasing ROM, reducing/eliminating soft tissue swelling/inflammation/restriction, improving soft tissue extensibility and allowing for proper ROM for normal function with self care, mobility, lifting and ambulation. Modalities: PM/MHP in prone 15' along belt line      Charges:  Timed Code Treatment Minutes: 50   Total Treatment Minutes: 65     [] EVAL (LOW) 73010 (typically 20 minutes face-to-face)  [] EVAL (MOD) 85599 (typically 30 minutes face-to-face)  [] EVAL (HIGH) 22800 (typically 45 minutes face-to-face)  [] RE-EVAL     [x] SG(66988) x 2    [] IONTO  [] NMR (08462) x     [] VASO  [x] Manual (18420) x1      [] Other:  [] TA x      [] Mech Traction (41092)  [] ES(attended) (78744)      [x] ES (un) (37132):     Goals:   Patient stated goal: \"Less pain, more mobility\"  [] Progressing: [] Met: [] Not Met: [] Adjusted    Therapist goals for Patient:   Short Term Goals: To be achieved in: 2 weeks  1. Independent in HEP and progression per patient tolerance, in order to prevent re-injury. [] Progressing: [] Met: [] Not Met: [] Adjusted  2. Patient will have a decrease in pain to facilitate improvement in movement, function, and ADLs as indicated by Functional Deficits. [] Progressing: [] Met: [] Not Met: [] Adjusted      Long Term Goals: To be achieved in: 8 weeks  1. Disability index score of 18% or less for the Modified Oswestry  to assist with reaching prior level of function. [] Progressing: [] Met: [] Not Met: [] Adjusted  2. Patient will demonstrate increased hamstring flexibility to ~80 deg SLR B to allow for proper joint functioning as indicated by patients Functional Deficits. [] Progressing: [] Met: [] Not Met: [] Adjusted  3. Patient will demonstrate an increase in Strength to 4+/5 hip flex on L and 4+/5 hip ABD B to allow for proper functional mobility as indicated by patients Functional Deficits. [] Progressing: [] Met: [] Not Met: [] Adjusted  4. Patient will return to standing and walking >2 hrs without increased symptoms or restriction. [] Progressing: [] Met: [] Not Met: [] Adjusted  5. Pt will return to recreational walking and work activities including heavy lifting without increased symptoms or restriction (patient specific functional goal)    [] Progressing: [] Met: [] Not Met: [] Adjusted    Progression Towards Functional goals:  [x] Patient is progressing as expected towards functional goals listed. [] Progression is slowed due to complexities listed. [] Progression has been slowed due to co-morbidities. [] Plan just implemented, too soon to assess goals progression  [] Other:     Overall Progression Towards Functional goals/ Treatment Progress Update:  [] Patient is progressing as expected towards functional goals listed. [] Progression is slowed due to complexities/Impairments listed. [] Progression has been slowed due to co-morbidities. [x] Plan just implemented, too soon to assess goals progression <30days   [] Goals require adjustment due to lack of progress  [] Patient is not progressing as expected and requires additional follow up with physician  [] Other    Prognosis for POC: [x] Good [] Fair  [] Poor      Patient requires continued skilled intervention: [x] Yes  [] No    Treatment/Activity Tolerance:  [x] Patient able to complete treatment  [] Patient limited by fatigue  [] Patient limited by pain    [] Patient limited by other medical complications  [] Other:     ASSESSMENT: Pt doing well at this time, does feel that the exercises are giving him some good relief. Does fatigue with strengthening exercises quickly.       PLAN: Cont hip strength and core activation in various positions, potential traction  [x] Continue per plan of care [] Alter current plan (see comments above)  [] Plan of care initiated [] Hold pending MD visit [] Discharge      Electronically signed by: [address], [place] [id number]    Note: If patient does not return for scheduled/ recommended follow up visits, this note will serve as a discharge from care along with most recent update on progress.

## 2020-03-04 ENCOUNTER — HOSPITAL ENCOUNTER (OUTPATIENT)
Dept: PHYSICAL THERAPY | Age: 50
Setting detail: THERAPIES SERIES
Discharge: HOME | End: 2020-03-04
Payer: COMMERCIAL

## 2020-03-04 PROCEDURE — G0283 ELEC STIM OTHER THAN WOUND: HCPCS

## 2020-03-04 PROCEDURE — 97110 THERAPEUTIC EXERCISES: CPT

## 2020-03-04 PROCEDURE — 97140 MANUAL THERAPY 1/> REGIONS: CPT

## 2020-03-04 PROCEDURE — 97012 MECHANICAL TRACTION THERAPY: CPT

## 2020-03-04 PROCEDURE — 97112 NEUROMUSCULAR REEDUCATION: CPT

## 2020-03-04 NOTE — FLOWSHEET NOTE
[facility],  [address]  Phone: [phone]  Fax [phone]    Physical Therapy Treatment Note/ Progress Report:           Date:  3/4/2020    Patient Name:  Camille Curry  \"KARRIE\"  :  1970  MRN: [id number]  Restrictions/Precautions:    Medical/Treatment Diagnosis Information:  · Diagnosis: Spinal stenosis of lumbar region w/ neurogenic claudication (M48.062)  · Treatment Diagnosis: Lumbar pain (K12.1)  Insurance/Certification information:  PT Insurance Information: [facility]  Physician Information:  Referring Practitioner: Dr. Walter Martinez  Has the plan of care been signed (Y/N):        []  Yes  [x]  No     Date of Patient follow up with Physician:       Is this a Progress Report:     []  Yes  [x]  No        If Yes:  Date Range for reporting period:  Beginning  Ending    Progress report will be due (10 Rx or 30 days whichever is less): 3/31/58       Recertification will be due (POC Duration  / 90 days whichever is less): 20        Visit # Insurance Allowable Auth Required   4 30 [x]  Yes []  No   If >30 visits       Functional Scale: Modified Oswestry 36%    Date assessed:  20     Therapy Diagnosis/Practice Pattern:F      Number of Comorbidities:  []0     [x]1-2    []3+    Latex Allergy:  [x]NO      []YES  Preferred Language for Healthcare:   [x]English       []other:      Pain level: 1-6/10    SUBJECTIVE:  Pt reports he was having some n/t in L ankle today. Some pain in R leg as well- not sure why. Has been working pretty hard this past week.     OBJECTIVE: See eval   Observation:    Test measurements:      RESTRICTIONS/PRECAUTIONS: hx cervical pain    Exercises/Interventions:     Therapeutic Ex (65585) Sets/sec Reps Notes/CUES   Prone SLR ext w/ TA 2 10 Alt R/L   Prone TA w/ glute set 10\" 10x    SB roll in 5\" 20x    Bridge w/ ADD 5\" 2 x 10    SLR flex B 1 15 Fatigue; HEP   SLR abd B 1 10    HS stretch at stairs B 30\" 3 HEP                                       Manual Intervention (05735)      STM lumbar paraspinals/glutes  TPR glutes/piriformis L  Hip ER stretch B, prone quad stretch  Sacral mobs, PA mobs L spine   15'                                   NMR re-education (14989)   CUES NEEDED                                                         Therapeutic Activity (32292)                                              Therapeutic Exercise and NMR EXR  [x] (77617) Provided verbal/tactile cueing for activities related to strengthening, flexibility, endurance, ROM  for improvements in proximal hip and core control with self care, mobility, lifting and ambulation.  [] (45841) Provided verbal/tactile cueing for activities related to improving balance, coordination, kinesthetic sense, posture, motor skill, proprioception  to assist with core control in self care, mobility, lifting, and ambulation.      Therapeutic Activities:    [] (67004 or 71624) Provided verbal/tactile cueing for activities related to improving balance, coordination, kinesthetic sense, posture, motor skill, proprioception and motor activation to allow for proper function  with self care and ADLs  [] (59478) Provided training and instruction to the patient for proper core and proximal hip recruitment and positioning with ambulation re-education     Home Exercise Program:    [x] (56361) Reviewed/Progressed HEP activities related to strengthening, flexibility, endurance, ROM of core, proximal hip and LE for functional self-care, mobility, lifting and ambulation   [] (10894) Reviewed/Progressed HEP activities related to improving balance, coordination, kinesthetic sense, posture, motor skill, proprioception of core, proximal hip and LE for self care, mobility, lifting, and ambulation      Manual Treatments:  PROM / STM / Oscillations-Mobs:  G-I, II, III, IV (PA's, Inf., Post.)  [x] (70624) Provided manual therapy to mobilize proximal hip and LS

## 2020-03-06 ENCOUNTER — HOSPITAL ENCOUNTER (OUTPATIENT)
Dept: PHYSICAL THERAPY | Age: 50
Setting detail: THERAPIES SERIES
Discharge: HOME | End: 2020-03-06
Payer: COMMERCIAL

## 2020-03-06 PROCEDURE — G0283 ELEC STIM OTHER THAN WOUND: HCPCS

## 2020-03-06 PROCEDURE — 97140 MANUAL THERAPY 1/> REGIONS: CPT

## 2020-03-06 PROCEDURE — 97110 THERAPEUTIC EXERCISES: CPT

## 2020-03-06 NOTE — FLOWSHEET NOTE
[facility],  [address] [place]  Phone: [phone]  Fax [phone]    Physical Therapy Treatment Note/ Progress Report:           Date:  3/6/2020    Patient Name:  Apurva Li  \"KARRIE\"  :  1970  MRN: [id number]  Restrictions/Precautions:    Medical/Treatment Diagnosis Information:  · Diagnosis: Spinal stenosis of lumbar region w/ neurogenic claudication (M48.062)  · Treatment Diagnosis: Lumbar pain (F86.8)  Insurance/Certification information:  PT Insurance Information: [facility]  Physician Information:  Referring Practitioner: Dr. Gabriela Soliman  Has the plan of care been signed (Y/N):        []  Yes  [x]  No     Date of Patient follow up with Physician:       Is this a Progress Report:     []  Yes  [x]  No        If Yes:  Date Range for reporting period:  Beginning  Ending    Progress report will be due (10 Rx or 30 days whichever is less):        Recertification will be due (POC Duration  / 90 days whichever is less): 20        Visit # Insurance Allowable Auth Required   5 30 [x]  Yes []  No   If >30 visits       Functional Scale: Modified Oswestry 36%    Date assessed:  20     Therapy Diagnosis/Practice Pattern:F      Number of Comorbidities:  []0     [x]1-2    []3+    Latex Allergy:  [x]NO      []YES  Preferred Language for Healthcare:   [x]English       []other:      Pain level: 1-610    SUBJECTIVE:  Pt states yesterday was a bad day, was pretty sore when he left. Doesn't think traction helped.     OBJECTIVE: See eval   Observation:    Test measurements:      RESTRICTIONS/PRECAUTIONS: hx cervical pain    Exercises/Interventions:     Therapeutic Ex (02230) Sets/sec Reps Notes/CUES   Bike 7'  After manuals   Prone SLR ext w/ TA 2 10 Alt R/L      Supine HS stretch 90/90 10\" 10x R/L Fig 4 stretch B 30\" 3 HEp         LTR 5\" 10x R/L HEP   Bridge w/ ADD 5\" 2 x 10       Clamshells B 15x ea fatigue                                       Manual Intervention (55130)      STM lumbar paraspinals/glutes  TPR glutes/piriformis L  Hip ER stretch B, prone quad stretch  Sacral mobs, PA mobs L spine   15'                                   NMR re-education (31962)   CUES NEEDED                                                         Therapeutic Activity (63703)                                              Therapeutic Exercise and NMR EXR  [x] (37355) Provided verbal/tactile cueing for activities related to strengthening, flexibility, endurance, ROM  for improvements in proximal hip and core control with self care, mobility, lifting and ambulation.  [] (88409) Provided verbal/tactile cueing for activities related to improving balance, coordination, kinesthetic sense, posture, motor skill, proprioception  to assist with core control in self care, mobility, lifting, and ambulation.      Therapeutic Activities:    [] (35420 or 95838) Provided verbal/tactile cueing for activities related to improving balance, coordination, kinesthetic sense, posture, motor skill, proprioception and motor activation to allow for proper function  with self care and ADLs  [] (45775) Provided training and instruction to the patient for proper core and proximal hip recruitment and positioning with ambulation re-education     Home Exercise Program:    [x] (30140) Reviewed/Progressed HEP activities related to strengthening, flexibility, endurance, ROM of core, proximal hip and LE for functional self-care, mobility, lifting and ambulation   [] (07295) Reviewed/Progressed HEP activities related to improving balance, coordination, kinesthetic sense, posture, motor skill, proprioception of core, proximal hip and LE for self care, mobility, lifting, and ambulation      Manual Treatments:  PROM / STM / Oscillations-Mobs:  G-I, II, III, IV (PA's, Inf., Post.)  [x] (83392) Provided manual therapy to mobilize proximal hip and LS spine soft tissue/joints for the purpose of modulating pain, promoting relaxation,  increasing ROM, reducing/eliminating soft tissue swelling/inflammation/restriction, improving soft tissue extensibility and allowing for proper ROM for normal function with self care, mobility, lifting and ambulation. Modalities:    PM/MHP in prone 15' along belt line      Charges:  Timed Code Treatment Minutes: 40   Total Treatment Minutes: 55     [] EVAL (LOW) 12924 (typically 20 minutes face-to-face)  [] EVAL (MOD) 67073 (typically 30 minutes face-to-face)  [] EVAL (HIGH) 14969 (typically 45 minutes face-to-face)  [] RE-EVAL     [x] MB(80067) x 2  [] IONTO  [] NMR (17713) x     [] VASO  [x] Manual (45620) x1      [] Other:  [] TA x      [x] Mech Traction (84957)  [] ES(attended) (98229)      [x] ES (un) (49895):     Goals:   Patient stated goal: \"Less pain, more mobility\"  [] Progressing: [] Met: [] Not Met: [] Adjusted    Therapist goals for Patient:   Short Term Goals: To be achieved in: 2 weeks  1. Independent in HEP and progression per patient tolerance, in order to prevent re-injury. [] Progressing: [] Met: [] Not Met: [] Adjusted  2. Patient will have a decrease in pain to facilitate improvement in movement, function, and ADLs as indicated by Functional Deficits. [] Progressing: [] Met: [] Not Met: [] Adjusted      Long Term Goals: To be achieved in: 8 weeks  1. Disability index score of 18% or less for the Modified Oswestry  to assist with reaching prior level of function. [] Progressing: [] Met: [] Not Met: [] Adjusted  2. Patient will demonstrate increased hamstring flexibility to ~80 deg SLR B to allow for proper joint functioning as indicated by patients Functional Deficits. [] Progressing: [] Met: [] Not Met: [] Adjusted  3. Patient will demonstrate an increase in Strength to 4+/5 hip flex on L and 4+/5 hip ABD B to allow for proper functional mobility as indicated by patients Functional Deficits.    [] Progressing: [] Met: [] Not Met: [] Adjusted  4. Patient will return to standing and walking >2 hrs without increased symptoms or restriction. [] Progressing: [] Met: [] Not Met: [] Adjusted  5. Pt will return to recreational walking and work activities including heavy lifting without increased symptoms or restriction (patient specific functional goal)    [] Progressing: [] Met: [] Not Met: [] Adjusted    Progression Towards Functional goals:  [x] Patient is progressing as expected towards functional goals listed. [] Progression is slowed due to complexities listed. [] Progression has been slowed due to co-morbidities. [] Plan just implemented, too soon to assess goals progression  [] Other:     Overall Progression Towards Functional goals/ Treatment Progress Update:  [] Patient is progressing as expected towards functional goals listed. [x] Progression is slowed due to complexities/Impairments listed. [] Progression has been slowed due to co-morbidities. [] Plan just implemented, too soon to assess goals progression <30days   [] Goals require adjustment due to lack of progress  [] Patient is not progressing as expected and requires additional follow up with physician  [] Other    Prognosis for POC: [x] Good [] Fair  [] Poor      Patient requires continued skilled intervention: [x] Yes  [] No    Treatment/Activity Tolerance:  [x] Patient able to complete treatment  [] Patient limited by fatigue  [] Patient limited by pain    [] Patient limited by other medical complications  [] Other:     ASSESSMENT: Pt did not have a positive response to traction thus discontinued today. Focused on mobility work d/t inc stiffness from heavy workload this week. Responded well and had decreased discomfort at end of session.       PLAN: reassess  [x] Continue per plan of care [] Alter current plan (see comments above)  [] Plan of care initiated [] Hold pending MD visit [] Discharge      Electronically signed by:  Esther Buchanan

## 2020-03-11 ENCOUNTER — HOSPITAL ENCOUNTER (OUTPATIENT)
Dept: PHYSICAL THERAPY | Age: 50
Setting detail: THERAPIES SERIES
Discharge: HOME | End: 2020-03-11
Payer: COMMERCIAL

## 2020-03-13 ENCOUNTER — HOSPITAL ENCOUNTER (OUTPATIENT)
Dept: PHYSICAL THERAPY | Age: 50
Setting detail: THERAPIES SERIES
Discharge: HOME | End: 2020-03-13
Payer: COMMERCIAL

## 2020-03-13 PROCEDURE — 97140 MANUAL THERAPY 1/> REGIONS: CPT

## 2020-03-13 PROCEDURE — G0283 ELEC STIM OTHER THAN WOUND: HCPCS

## 2020-03-13 PROCEDURE — 97110 THERAPEUTIC EXERCISES: CPT

## 2020-03-13 NOTE — PROGRESS NOTES
[facility],  [address]  Phone: [phone]  Fax [phone]    Physical Therapy Treatment Note/ Progress Report:           Date:  3/13/2020    Patient Name:  Buck Wolfe  \"KARRIE\"  :  1970  MRN: [id number]  Restrictions/Precautions:    Medical/Treatment Diagnosis Information:  · Diagnosis: Spinal stenosis of lumbar region w/ neurogenic claudication (M48.062)  · Treatment Diagnosis: Lumbar pain (H68.9)  Insurance/Certification information:  PT Insurance Information: [facility]  Physician Information:  Referring Practitioner: Dr. [facility]  Has the plan of care been signed (Y/N):        []  Yes  [x]  No     Date of Patient follow up with Physician:       Is this a Progress Report:     []  Yes  [x]  No        If Yes:  Date Range for reporting period:  Beginnin20  Ending: 3/13/20    Progress report will be due (10 Rx or 30 days whichever is less): 22       Recertification will be due (POC Duration  / 90 days whichever is less): 20        Visit # Insurance Allowable Auth Required   6 30 [x]  Yes []  No   If >30 visits       Functional Scale: Modified Oswestry 22%    Date assessed:  3/13/20     Therapy Diagnosis/Practice Pattern:F      Number of Comorbidities:  []0     [x]1-2    []3+    Latex Allergy:  [x]NO      []YES  Preferred Language for Healthcare:   [x]English       []other:      Pain level: 1-2/10    SUBJECTIVE:  Pt states he has some numbness through his hips and down the left leg some. Not too bad today. Has been really busy so hasn't done much of the exercises. States overall he is feeling some better since starting therapy, kind of hard to tell.     OBJECTIVE:    Observation:    Test measurements:  90 deg SLR (HS flex) supine B                                            4/5 L hip flex, 4+/5 R hip flex, 4+/5 B hip abd    RESTRICTIONS/PRECAUTIONS: hx cervical pain    Exercises/Interventions:     Therapeutic Ex (90549) Sets/sec Reps Notes/CUES   Bike 5'  After manuals   Prone SLR ext w/ TA 2 10 Alt R/L      Supine HS stretch 90/90 10\" 10x R/L HEP         HEP      SLR flex L 2 10 Fatigue; HEP      Clamshells B 2 10 fatigue         Leg press 100# 2 10    TKE 60# 2 10 x 5\"                      Manual Intervention (44720)      STM lumbar paraspinals/glutes  TPR glutes/piriformis L  Hip ER stretch B, prone quad stretch  Sacral mobs, PA mobs L spine   15'                                   NMR re-education (49662)   CUES NEEDED   SLB airex R/L 10\" 10x ea                                                    Therapeutic Activity (61780)                                              Therapeutic Exercise and NMR EXR  [x] (10736) Provided verbal/tactile cueing for activities related to strengthening, flexibility, endurance, ROM  for improvements in proximal hip and core control with self care, mobility, lifting and ambulation.  [] (25341) Provided verbal/tactile cueing for activities related to improving balance, coordination, kinesthetic sense, posture, motor skill, proprioception  to assist with core control in self care, mobility, lifting, and ambulation.      Therapeutic Activities:    [] (40443 or 35702) Provided verbal/tactile cueing for activities related to improving balance, coordination, kinesthetic sense, posture, motor skill, proprioception and motor activation to allow for proper function  with self care and ADLs  [] (91504) Provided training and instruction to the patient for proper core and proximal hip recruitment and positioning with ambulation re-education     Home Exercise Program:    [x] (48834) Reviewed/Progressed HEP activities related to strengthening, flexibility, endurance, ROM of core, proximal hip and LE for functional self-care, mobility, lifting and ambulation   [] (12758) Reviewed/Progressed HEP activities related to improving balance, coordination, functioning as indicated by patients Functional Deficits. [] Progressing: [x] Met: [] Not Met: [] Adjusted  3. Patient will demonstrate an increase in Strength to 4+/5 hip flex on L and 4+/5 hip ABD B to allow for proper functional mobility as indicated by patients Functional Deficits. [x] Progressing: [] Met: [] Not Met: [] Adjusted  4. Patient will return to standing and walking >2 hrs without increased symptoms or restriction. [] Progressing: [x] Met: [] Not Met: [] Adjusted  5. Pt will return to recreational walking and work activities including heavy lifting without increased symptoms or restriction (patient specific functional goal)     [] Progressing: [x] Met: [] Not Met: [] Adjusted    Progression Towards Functional goals:  [x] Patient is progressing as expected towards functional goals listed. [] Progression is slowed due to complexities listed. [] Progression has been slowed due to co-morbidities. [] Plan just implemented, too soon to assess goals progression  [] Other:     Overall Progression Towards Functional goals/ Treatment Progress Update:  [] Patient is progressing as expected towards functional goals listed. [x] Progression is slowed due to complexities/Impairments listed. [] Progression has been slowed due to co-morbidities. [] Plan just implemented, too soon to assess goals progression <30days   [] Goals require adjustment due to lack of progress  [] Patient is not progressing as expected and requires additional follow up with physician  [] Other    Prognosis for POC: [x] Good [] Fair  [] Poor      Patient requires continued skilled intervention: [x] Yes  [] No    Treatment/Activity Tolerance:  [x] Patient able to complete treatment  [] Patient limited by fatigue  [] Patient limited by pain    [] Patient limited by other medical complications  [] Other:     ASSESSMENT: Pt did well with TE today, does continue to demonstrate increased weakness on L side.   Discussed continuing PT to

## 2020-03-18 ENCOUNTER — HOSPITAL ENCOUNTER (OUTPATIENT)
Dept: PHYSICAL THERAPY | Age: 50
Setting detail: THERAPIES SERIES
Discharge: HOME | End: 2020-03-18
Payer: COMMERCIAL

## 2020-03-18 NOTE — FLOWSHEET NOTE
[facility],  [address]        Physical Therapy  Cancellation/No-show Note  Patient Name:  Skip Molina  :  1970   Date:  3/18/2020  Cancelled visits to date: 2  No-shows to date: 1    For today's appointment patient:      Cancelled  ? Rescheduled appointment  x No-show     Reason given by patient:  ?   Patient ill  ? Conflicting appointment  ? No transportation        Conflict with work  x  No reason given  ?   Other:     Comments:      Electronically signed by:  Pedro Mendez, PT,DPT [id number]

## 2020-03-20 ENCOUNTER — APPOINTMENT (OUTPATIENT)
Dept: PHYSICAL THERAPY | Age: 50
End: 2020-03-20
Payer: COMMERCIAL

## 2020-03-25 ENCOUNTER — APPOINTMENT (OUTPATIENT)
Dept: PHYSICAL THERAPY | Age: 50
End: 2020-03-25
Payer: COMMERCIAL

## 2020-04-01 ENCOUNTER — NURSE TRIAGE (OUTPATIENT)
Dept: OTHER | Facility: CLINIC | Age: 50
End: 2020-04-01

## 2020-04-01 NOTE — TELEPHONE ENCOUNTER
Reason for Disposition   [1] Mild body aches, chills, diarrhea, headache, runny nose, or sore throat AND [2] within 14 days of COVID-Exposure    Answer Assessment - Initial Assessment Questions  1. CLOSE CONTACT: \"Who is the person with the confirmed or suspected COVID-19 infection that you were exposed to? \"      unsure  2. PLACE of CONTACT: \"Where were you when you were exposed to COVID-19? \" (e.g., home, school, medical waiting room; which city?)      unsure  3. TYPE of CONTACT: \"How much contact was there? \" (e.g., sitting next to, live in same house, work in same office, same building)      unsure  4. DURATION of CONTACT: \"How long were you in contact with the COVID-19 patient? \" (e.g., a few seconds, passed by person, a few minutes, live with the patient)      unsure  5. DATE of CONTACT: \"When did you have contact with a COVID-19 patient? \" (e.g., how many days ago)      unsure  6. TRAVEL: \"Have you traveled out of the country recently? \" If so, \"When and where? \"      * Also ask about out-of-state travel, since the [facility] has identified some high risk cities for community spread in the [address] [place] [address],[address]. * Note: Travel becomes less relevant if there is widespread community transmission where the patient lives. unsure  7. COMMUNITY SPREAD: \"Are there lots of cases or COVID-19 (community spread) where you live? \" (See public health department website, if unsure)    * MAJOR community spread: high number of cases; numbers of cases are increasing; many people hospitalized. * MINOR community spread: low number of cases; not increasing; few or no people hospitalized      minor  8. SYMPTOMS: \"Do you have any symptoms? \" (e.g., fever, cough, breathing difficulty)      Shortness of breath  9. PREGNANCY OR POSTPARTUM: \"Is there any chance you are pregnant? \" \"When was your last menstrual period? \" \"Did you deliver in the last 2 weeks? \"      n/a  10. HIGH RISK: \"Do you have any heart or lung problems?  Do you have a weak immune

## 2020-06-27 ENCOUNTER — HOSPITAL ENCOUNTER (EMERGENCY)
Age: 50
Discharge: LEFT BEFORE DISCHARGE | End: 2020-06-27
Payer: COMMERCIAL

## 2020-06-27 ENCOUNTER — HOSPITAL ENCOUNTER (INPATIENT)
Age: 50
LOS: 2 days | Discharge: HOME | DRG: 322 | End: 2020-07-04
Attending: EMERGENCY MEDICINE | Admitting: PEDIATRICS
Payer: COMMERCIAL

## 2020-06-27 ENCOUNTER — APPOINTMENT (OUTPATIENT)
Dept: GENERAL RADIOLOGY | Age: 50
End: 2020-06-27
Payer: COMMERCIAL

## 2020-06-27 VITALS
SYSTOLIC BLOOD PRESSURE: 157 MMHG | WEIGHT: 160 LBS | RESPIRATION RATE: 12 BRPM | TEMPERATURE: 97.8 F | HEART RATE: 116 BPM | DIASTOLIC BLOOD PRESSURE: 100 MMHG | OXYGEN SATURATION: 93 % | HEIGHT: 75 IN | BODY MASS INDEX: 19.89 KG/M2

## 2020-06-27 PROCEDURE — G0480 DRUG TEST DEF 1-7 CLASSES: HCPCS

## 2020-06-27 PROCEDURE — 86850 RBC ANTIBODY SCREEN: CPT

## 2020-06-27 PROCEDURE — 85730 THROMBOPLASTIN TIME PARTIAL: CPT

## 2020-06-27 PROCEDURE — 85025 COMPLETE CBC W/AUTO DIFF WBC: CPT

## 2020-06-27 PROCEDURE — 99283 EMERGENCY DEPT VISIT LOW MDM: CPT

## 2020-06-27 PROCEDURE — 73030 X-RAY EXAM OF SHOULDER: CPT

## 2020-06-27 PROCEDURE — 85610 PROTHROMBIN TIME: CPT

## 2020-06-27 PROCEDURE — 86901 BLOOD TYPING SEROLOGIC RH(D): CPT

## 2020-06-27 PROCEDURE — 80053 COMPREHEN METABOLIC PANEL: CPT

## 2020-06-27 PROCEDURE — 86900 BLOOD TYPING SEROLOGIC ABO: CPT

## 2020-06-27 PROCEDURE — 99284 EMERGENCY DEPT VISIT MOD MDM: CPT

## 2020-06-27 RX ORDER — LORAZEPAM 2 MG/ML
4 INJECTION INTRAMUSCULAR
Status: DISCONTINUED | OUTPATIENT
Start: 2020-06-27 | End: 2020-07-04 | Stop reason: HOSPADM

## 2020-06-27 RX ORDER — LORAZEPAM 1 MG/1
1 TABLET ORAL
Status: DISCONTINUED | OUTPATIENT
Start: 2020-06-27 | End: 2020-07-04 | Stop reason: HOSPADM

## 2020-06-27 RX ORDER — LORAZEPAM 1 MG/1
4 TABLET ORAL
Status: DISCONTINUED | OUTPATIENT
Start: 2020-06-27 | End: 2020-07-04 | Stop reason: HOSPADM

## 2020-06-27 RX ORDER — SODIUM CHLORIDE 0.9 % (FLUSH) 0.9 %
10 SYRINGE (ML) INJECTION PRN
Status: DISCONTINUED | OUTPATIENT
Start: 2020-06-27 | End: 2020-06-29 | Stop reason: SDUPTHER

## 2020-06-27 RX ORDER — LORAZEPAM 2 MG/ML
3 INJECTION INTRAMUSCULAR
Status: DISCONTINUED | OUTPATIENT
Start: 2020-06-27 | End: 2020-07-04 | Stop reason: HOSPADM

## 2020-06-27 RX ORDER — LORAZEPAM 1 MG/1
2 TABLET ORAL
Status: DISCONTINUED | OUTPATIENT
Start: 2020-06-27 | End: 2020-07-04 | Stop reason: HOSPADM

## 2020-06-27 RX ORDER — LORAZEPAM 2 MG/ML
2 INJECTION INTRAMUSCULAR
Status: DISCONTINUED | OUTPATIENT
Start: 2020-06-27 | End: 2020-07-04 | Stop reason: HOSPADM

## 2020-06-27 RX ORDER — LORAZEPAM 1 MG/1
3 TABLET ORAL
Status: DISCONTINUED | OUTPATIENT
Start: 2020-06-27 | End: 2020-07-04 | Stop reason: HOSPADM

## 2020-06-27 RX ORDER — SODIUM CHLORIDE 0.9 % (FLUSH) 0.9 %
10 SYRINGE (ML) INJECTION EVERY 12 HOURS SCHEDULED
Status: DISCONTINUED | OUTPATIENT
Start: 2020-06-28 | End: 2020-06-29 | Stop reason: SDUPTHER

## 2020-06-27 RX ORDER — LORAZEPAM 2 MG/ML
1 INJECTION INTRAMUSCULAR
Status: DISCONTINUED | OUTPATIENT
Start: 2020-06-27 | End: 2020-07-04 | Stop reason: HOSPADM

## 2020-06-27 RX ORDER — ACETAMINOPHEN 325 MG/1
650 TABLET ORAL ONCE
Status: DISCONTINUED | OUTPATIENT
Start: 2020-06-27 | End: 2020-06-27

## 2020-06-27 ASSESSMENT — PAIN DESCRIPTION - LOCATION
LOCATION: SHOULDER
LOCATION: SHOULDER

## 2020-06-27 ASSESSMENT — PAIN SCALES - GENERAL
PAINLEVEL_OUTOF10: 8
PAINLEVEL_OUTOF10: 10

## 2020-06-27 ASSESSMENT — PAIN DESCRIPTION - ONSET
ONSET: ON-GOING
ONSET: ON-GOING

## 2020-06-27 ASSESSMENT — PAIN DESCRIPTION - DESCRIPTORS
DESCRIPTORS: CONSTANT
DESCRIPTORS: CONSTANT

## 2020-06-27 ASSESSMENT — PAIN DESCRIPTION - FREQUENCY
FREQUENCY: CONTINUOUS
FREQUENCY: CONTINUOUS

## 2020-06-27 ASSESSMENT — PAIN DESCRIPTION - PROGRESSION
CLINICAL_PROGRESSION: GRADUALLY WORSENING
CLINICAL_PROGRESSION: GRADUALLY WORSENING

## 2020-06-27 ASSESSMENT — PAIN DESCRIPTION - ORIENTATION
ORIENTATION: RIGHT
ORIENTATION: RIGHT

## 2020-06-27 ASSESSMENT — PAIN DESCRIPTION - PAIN TYPE
TYPE: ACUTE PAIN
TYPE: ACUTE PAIN

## 2020-06-28 PROBLEM — F10.10 ETOH ABUSE: Status: ACTIVE | Noted: 2020-06-28

## 2020-06-28 PROBLEM — S42.209A CLOSED FRACTURE OF PROXIMAL END OF HUMERUS: Status: ACTIVE | Noted: 2020-06-28

## 2020-06-28 PROBLEM — S42.291A HUMERAL HEAD FRACTURE, RIGHT, CLOSED, INITIAL ENCOUNTER: Status: ACTIVE | Noted: 2020-06-28

## 2020-06-28 LAB
A/G RATIO: 1.6 (ref 1.1–2.2)
ABO/RH: NORMAL
ALBUMIN SERPL-MCNC: 4.5 G/DL (ref 3.4–5)
ALP BLD-CCNC: 79 U/L (ref 40–129)
ALT SERPL-CCNC: 61 U/L (ref 10–40)
ANION GAP SERPL CALCULATED.3IONS-SCNC: 21 MMOL/L (ref 3–16)
ANTIBODY SCREEN: NORMAL
APTT: 25.1 SEC (ref 24.2–36.2)
AST SERPL-CCNC: 124 U/L (ref 15–37)
BASOPHILS ABSOLUTE: 0 K/UL (ref 0–0.2)
BASOPHILS RELATIVE PERCENT: 0.3 %
BILIRUB SERPL-MCNC: 0.4 MG/DL (ref 0–1)
BUN BLDV-MCNC: 13 MG/DL (ref 7–20)
CALCIUM SERPL-MCNC: 9.2 MG/DL (ref 8.3–10.6)
CHLORIDE BLD-SCNC: 94 MMOL/L (ref 99–110)
CO2: 18 MMOL/L (ref 21–32)
CREAT SERPL-MCNC: 0.6 MG/DL (ref 0.9–1.3)
EOSINOPHILS ABSOLUTE: 0 K/UL (ref 0–0.6)
EOSINOPHILS RELATIVE PERCENT: 0 %
ETHANOL: 13 MG/DL (ref 0–0.08)
ETHANOL: 305 MG/DL (ref 0–0.08)
GFR AFRICAN AMERICAN: >60
GFR NON-AFRICAN AMERICAN: >60
GLOBULIN: 2.8 G/DL
GLUCOSE BLD-MCNC: 165 MG/DL (ref 70–99)
HCT VFR BLD CALC: 41.4 % (ref 40.5–52.5)
HEMOGLOBIN: 14.3 G/DL (ref 13.5–17.5)
INR BLD: 0.94 (ref 0.86–1.14)
LYMPHOCYTES ABSOLUTE: 0.4 K/UL (ref 1–5.1)
LYMPHOCYTES RELATIVE PERCENT: 3.2 %
MCH RBC QN AUTO: 34.3 PG (ref 26–34)
MCHC RBC AUTO-ENTMCNC: 34.6 G/DL (ref 31–36)
MCV RBC AUTO: 99.1 FL (ref 80–100)
MONOCYTES ABSOLUTE: 1.1 K/UL (ref 0–1.3)
MONOCYTES RELATIVE PERCENT: 8.6 %
NEUTROPHILS ABSOLUTE: 11.3 K/UL (ref 1.7–7.7)
NEUTROPHILS RELATIVE PERCENT: 87.9 %
PDW BLD-RTO: 13.7 % (ref 12.4–15.4)
PLATELET # BLD: 112 K/UL (ref 135–450)
PMV BLD AUTO: 8.4 FL (ref 5–10.5)
POTASSIUM REFLEX MAGNESIUM: 3.8 MMOL/L (ref 3.5–5.1)
PROTHROMBIN TIME: 10.9 SEC (ref 10–13.2)
RBC # BLD: 4.17 M/UL (ref 4.2–5.9)
SODIUM BLD-SCNC: 133 MMOL/L (ref 136–145)
TOTAL PROTEIN: 7.3 G/DL (ref 6.4–8.2)
WBC # BLD: 12.8 K/UL (ref 4–11)

## 2020-06-28 PROCEDURE — G0480 DRUG TEST DEF 1-7 CLASSES: HCPCS

## 2020-06-28 PROCEDURE — 6370000000 HC RX 637 (ALT 250 FOR IP): Performed by: EMERGENCY MEDICINE

## 2020-06-28 PROCEDURE — 96375 TX/PRO/DX INJ NEW DRUG ADDON: CPT

## 2020-06-28 PROCEDURE — 36415 COLL VENOUS BLD VENIPUNCTURE: CPT

## 2020-06-28 PROCEDURE — 6360000002 HC RX W HCPCS: Performed by: PEDIATRICS

## 2020-06-28 PROCEDURE — 6370000000 HC RX 637 (ALT 250 FOR IP): Performed by: PEDIATRICS

## 2020-06-28 PROCEDURE — 94761 N-INVAS EAR/PLS OXIMETRY MLT: CPT

## 2020-06-28 PROCEDURE — 96376 TX/PRO/DX INJ SAME DRUG ADON: CPT

## 2020-06-28 PROCEDURE — G0378 HOSPITAL OBSERVATION PER HR: HCPCS

## 2020-06-28 PROCEDURE — 6360000002 HC RX W HCPCS: Performed by: EMERGENCY MEDICINE

## 2020-06-28 PROCEDURE — 2580000003 HC RX 258: Performed by: PHYSICIAN ASSISTANT

## 2020-06-28 PROCEDURE — 2500000003 HC RX 250 WO HCPCS: Performed by: EMERGENCY MEDICINE

## 2020-06-28 PROCEDURE — 2580000003 HC RX 258: Performed by: EMERGENCY MEDICINE

## 2020-06-28 PROCEDURE — 2700000000 HC OXYGEN THERAPY PER DAY

## 2020-06-28 RX ORDER — SODIUM CHLORIDE 0.9 % (FLUSH) 0.9 %
10 SYRINGE (ML) INJECTION PRN
Status: DISCONTINUED | OUTPATIENT
Start: 2020-06-28 | End: 2020-07-04 | Stop reason: HOSPADM

## 2020-06-28 RX ORDER — ONDANSETRON 2 MG/ML
4 INJECTION INTRAMUSCULAR; INTRAVENOUS EVERY 6 HOURS PRN
Status: DISCONTINUED | OUTPATIENT
Start: 2020-06-28 | End: 2020-07-04 | Stop reason: HOSPADM

## 2020-06-28 RX ORDER — ACETAMINOPHEN 325 MG/1
650 TABLET ORAL EVERY 6 HOURS PRN
Status: DISCONTINUED | OUTPATIENT
Start: 2020-06-28 | End: 2020-07-04 | Stop reason: HOSPADM

## 2020-06-28 RX ORDER — NICOTINE 21 MG/24HR
1 PATCH, TRANSDERMAL 24 HOURS TRANSDERMAL DAILY
Status: DISCONTINUED | OUTPATIENT
Start: 2020-06-28 | End: 2020-07-04 | Stop reason: HOSPADM

## 2020-06-28 RX ORDER — PROMETHAZINE HYDROCHLORIDE 25 MG/1
12.5 TABLET ORAL EVERY 6 HOURS PRN
Status: DISCONTINUED | OUTPATIENT
Start: 2020-06-28 | End: 2020-07-04 | Stop reason: HOSPADM

## 2020-06-28 RX ORDER — ACETAMINOPHEN 650 MG/1
650 SUPPOSITORY RECTAL EVERY 6 HOURS PRN
Status: DISCONTINUED | OUTPATIENT
Start: 2020-06-28 | End: 2020-07-04 | Stop reason: HOSPADM

## 2020-06-28 RX ORDER — NICOTINE 21 MG/24HR
1 PATCH, TRANSDERMAL 24 HOURS TRANSDERMAL DAILY
Status: DISCONTINUED | OUTPATIENT
Start: 2020-06-28 | End: 2020-06-28

## 2020-06-28 RX ORDER — MORPHINE SULFATE 4 MG/ML
4 INJECTION, SOLUTION INTRAMUSCULAR; INTRAVENOUS ONCE
Status: COMPLETED | OUTPATIENT
Start: 2020-06-28 | End: 2020-06-28

## 2020-06-28 RX ORDER — OXYCODONE HYDROCHLORIDE AND ACETAMINOPHEN 5; 325 MG/1; MG/1
1 TABLET ORAL EVERY 4 HOURS PRN
Status: DISCONTINUED | OUTPATIENT
Start: 2020-06-28 | End: 2020-07-04 | Stop reason: HOSPADM

## 2020-06-28 RX ORDER — IBUPROFEN 600 MG/1
600 TABLET ORAL EVERY 6 HOURS PRN
Status: DISCONTINUED | OUTPATIENT
Start: 2020-06-28 | End: 2020-07-04 | Stop reason: HOSPADM

## 2020-06-28 RX ORDER — SODIUM CHLORIDE 0.9 % (FLUSH) 0.9 %
10 SYRINGE (ML) INJECTION EVERY 12 HOURS SCHEDULED
Status: DISCONTINUED | OUTPATIENT
Start: 2020-06-28 | End: 2020-07-04 | Stop reason: HOSPADM

## 2020-06-28 RX ORDER — OXYCODONE HYDROCHLORIDE AND ACETAMINOPHEN 5; 325 MG/1; MG/1
2 TABLET ORAL EVERY 4 HOURS PRN
Status: DISCONTINUED | OUTPATIENT
Start: 2020-06-28 | End: 2020-07-04 | Stop reason: HOSPADM

## 2020-06-28 RX ORDER — POLYETHYLENE GLYCOL 3350 17 G/17G
17 POWDER, FOR SOLUTION ORAL DAILY PRN
Status: DISCONTINUED | OUTPATIENT
Start: 2020-06-28 | End: 2020-07-04 | Stop reason: HOSPADM

## 2020-06-28 RX ADMIN — LORAZEPAM 2 MG: 1 TABLET ORAL at 08:17

## 2020-06-28 RX ADMIN — LORAZEPAM 2 MG: 2 INJECTION, SOLUTION INTRAMUSCULAR; INTRAVENOUS at 13:17

## 2020-06-28 RX ADMIN — LORAZEPAM 2 MG: 2 INJECTION, SOLUTION INTRAMUSCULAR; INTRAVENOUS at 17:06

## 2020-06-28 RX ADMIN — FOLIC ACID: 5 INJECTION, SOLUTION INTRAMUSCULAR; INTRAVENOUS; SUBCUTANEOUS at 00:14

## 2020-06-28 RX ADMIN — Medication 10 ML: at 22:00

## 2020-06-28 RX ADMIN — LORAZEPAM 3 MG: 1 TABLET ORAL at 00:54

## 2020-06-28 RX ADMIN — Medication 4 MG: at 01:38

## 2020-06-28 RX ADMIN — HYDROMORPHONE HYDROCHLORIDE 1 MG: 1 INJECTION, SOLUTION INTRAMUSCULAR; INTRAVENOUS; SUBCUTANEOUS at 03:07

## 2020-06-28 RX ADMIN — LORAZEPAM 2 MG: 2 INJECTION, SOLUTION INTRAMUSCULAR; INTRAVENOUS at 15:21

## 2020-06-28 RX ADMIN — OXYCODONE HYDROCHLORIDE AND ACETAMINOPHEN 2 TABLET: 5; 325 TABLET ORAL at 15:21

## 2020-06-28 RX ADMIN — LORAZEPAM 2 MG: 1 TABLET ORAL at 04:29

## 2020-06-28 RX ADMIN — OXYCODONE HYDROCHLORIDE AND ACETAMINOPHEN 2 TABLET: 5; 325 TABLET ORAL at 08:17

## 2020-06-28 RX ADMIN — LORAZEPAM 1 MG: 1 TABLET ORAL at 11:42

## 2020-06-28 RX ADMIN — LORAZEPAM 2 MG: 1 TABLET ORAL at 05:28

## 2020-06-28 RX ADMIN — OXYCODONE HYDROCHLORIDE AND ACETAMINOPHEN 1 TABLET: 5; 325 TABLET ORAL at 21:59

## 2020-06-28 RX ADMIN — LORAZEPAM 2 MG: 2 INJECTION, SOLUTION INTRAMUSCULAR; INTRAVENOUS at 19:16

## 2020-06-28 RX ADMIN — LORAZEPAM 3 MG: 1 TABLET ORAL at 02:03

## 2020-06-28 RX ADMIN — LORAZEPAM 3 MG: 1 TABLET ORAL at 22:00

## 2020-06-28 ASSESSMENT — PAIN SCALES - GENERAL
PAINLEVEL_OUTOF10: 7
PAINLEVEL_OUTOF10: 2
PAINLEVEL_OUTOF10: 7
PAINLEVEL_OUTOF10: 4
PAINLEVEL_OUTOF10: 3
PAINLEVEL_OUTOF10: 6
PAINLEVEL_OUTOF10: 9
PAINLEVEL_OUTOF10: 5
PAINLEVEL_OUTOF10: 6
PAINLEVEL_OUTOF10: 7

## 2020-06-28 ASSESSMENT — PAIN DESCRIPTION - ORIENTATION
ORIENTATION: RIGHT

## 2020-06-28 ASSESSMENT — PAIN DESCRIPTION - PAIN TYPE
TYPE: ACUTE PAIN
TYPE: ACUTE PAIN

## 2020-06-28 ASSESSMENT — PAIN DESCRIPTION - LOCATION
LOCATION: SHOULDER

## 2020-06-28 NOTE — ED NOTES
Patient to ED room, reports that pain is 10 on 1-10 scale. Patient reports that had a drink prior to coming back to ED> Yulia to ED room to evaluate patient and discuss plan of care. Upon triage patient did not remember what Yulia had said about admission, only that \"he could not perform surgical procedure here. \" Re-orientated patient to plan of care.       Mariel Gómez, [address] [place] [address]  06/27/20 0094

## 2020-06-28 NOTE — ED NOTES
[facility] @ 4026   Re: humerus fx/dislocation, alcohol withdw  Dr. Cyril Meek @ [place] [address]  06/28/20 8995

## 2020-06-28 NOTE — ED NOTES
Patient identified as a positive fall risk on the ED triage fall screening. Patient placed in fall precautions which includes:  yellow fall risk bracelet on wrist, declined yellow socks on feet, \"Be Safe\" sign placed on patient's door, and bed alarm placed under patient/alarm turned on. Patient instructed on importance of not getting out of bed or ambulating without assistance for safety.           Johana Curiel, [address] [facility]  06/27/20 2015

## 2020-06-28 NOTE — ED NOTES
[facility] @ 2057   Re: Severely displaced humeral head and fracture  Dr. Berenice Knapp @ 2106     [facility]  06/27/20 2108

## 2020-06-28 NOTE — PROGRESS NOTES
Hospitalist Progress Note      PCP: No primary care provider on file. Date of Admission: 6/27/2020    Chief Complaint: Fall and right shoulder pain    Hospital Course: See H&P    Subjective:   Patient is up in bed, comfortable, not in distress. Complaining of right shoulder pain. No new event overnight noted. Medications:  Reviewed    Infusion Medications   Scheduled Medications    nicotine  1 patch Transdermal Daily    sodium chloride flush  10 mL Intravenous 2 times per day    [START ON 5/88/3774] folic acid, thiamine, multi-vitamin with vitamin K infusion   Intravenous Daily    [START ON 6/29/2020] ceFAZolin  2 g Intravenous On Call to OR    sodium chloride flush  10 mL Intravenous 2 times per day     PRN Meds: sodium chloride flush, acetaminophen **OR** acetaminophen, polyethylene glycol, promethazine **OR** ondansetron, ibuprofen, oxyCODONE-acetaminophen **OR** oxyCODONE-acetaminophen, HYDROmorphone, sodium chloride flush, LORazepam **OR** LORazepam **OR** LORazepam **OR** LORazepam **OR** LORazepam **OR** LORazepam **OR** LORazepam **OR** LORazepam    No intake or output data in the 24 hours ending 06/28/20 1410    Physical Exam Performed:    /78   Pulse 141   Temp 98 °F (36.7 °C) (Oral)   Resp 18   Ht 6' 3\" (1.905 m)   Wt 160 lb (72.6 kg)   SpO2 90%   BMI 20.00 kg/m²     General appearance: No apparent distress, appears stated age and cooperative. HEENT: Pupils equal, round, and reactive to light. Conjunctivae/corneas clear. Neck: Supple, with full range of motion. No jugular venous distention. Trachea midline. Respiratory:  Normal respiratory effort. Clear to auscultation, bilaterally without Rales/Wheezes/Rhonchi. Cardiovascular: Regular rate and rhythm with normal S1/S2 without murmurs, rubs or gallops. Abdomen: Soft, non-tender, non-distended with normal bowel sounds. Musculoskeletal: No clubbing, cyanosis or edema bilaterally. Right shoulder pain.   Skin: Skin color, texture, turgor normal.  No rashes or lesions. Neurologic:  Neurovascularly intact without any focal sensory/motor deficits. Cranial nerves: II-XII intact, grossly non-focal.  Psychiatric: Alert and oriented, thought content appropriate, normal insight  Capillary Refill: Brisk,< 3 seconds   Peripheral Pulses: +2 palpable, equal bilaterally       Labs:   Recent Labs     06/27/20  2355   WBC 12.8*   HGB 14.3   HCT 41.4   *     Recent Labs     06/27/20  2355   *   K 3.8   CL 94*   CO2 18*   BUN 13   CREATININE 0.6*   CALCIUM 9.2     Recent Labs     06/27/20  2355   *   ALT 61*   BILITOT 0.4   ALKPHOS 79     Recent Labs     06/27/20  2355   INR 0.94     No results for input(s): Vasquez Shown in the last 72 hours.     Urinalysis:      Lab Results   Component Value Date    NITRU Negative 09/19/2019    WBCUA 0-2 09/19/2019    BACTERIA 1+ 09/19/2019    RBCUA None seen 09/19/2019    BLOODU TRACE-INTACT 09/19/2019    SPECGRAV 1.015 09/19/2019    GLUCOSEU Negative 09/19/2019       Radiology:  No orders to display           Assessment/Plan:    Active Hospital Problems    Diagnosis    Closed fracture of proximal end of humerus [S42.209A]    ETOH abuse [F10.10]    Humeral head fracture, right, closed, initial encounter [S42.291A]     Acute comminuted fx of proximal humerus - initial encounter  Ortho consult  Antiemetic, pain management  Medically stable for emergent surgery.     ETOH abuse  [facility] protocol  Monitor       Tobacco abuse  Nicotine patch     DVT Prophylaxis: Lovenox  Diet: DIET GENERAL;  Diet NPO, After Midnight Exceptions are: Sips with Meds  Code Status: Full Code    PT/OT Eval Status: Not ordered    Dispo -2 to 4 days    Isabelle Gutierrez MD

## 2020-06-28 NOTE — ED PROVIDER NOTES
[facility] Emergency Department    CHIEF COMPLAINT  Shoulder Injury (patient was walking in creek and slipped landing on right shoulder. Patient reports pain to right shoulder 8 on 1-10 scale. )      HISTORY OF PRESENT ILLNESS  Arielle Gillespie is a 48 y.o. male who presents to the ED complaining of right shoulder injury. Patient reports that he slipped walking in a creek and landed onto his right shoulder. Patient reports injury actually happened yesterday. Patient is right-hand dominant. Patient believes he also hit the side of his head but denies loss of consciousness. Patient denies taking blood thinning medications. Patient denies any other injury or trauma. Patient denies numbness or tingling or extremity weakness. No other complaints, modifying factors or associated symptoms. Nursing notes reviewed. History reviewed. No pertinent past medical history. Past Surgical History:   Procedure Laterality Date    NERVE SURGERY Left     PERONEAL NERVE     History reviewed. No pertinent family history. Social History     Socioeconomic History    Marital status:      Spouse name: Not on file    Number of children: Not on file    Years of education: Not on file    Highest education level: Not on file   Occupational History    Not on file   Social Needs    Financial resource strain: Not on file    Food insecurity     Worry: Not on file     Inability: Not on file    Transportation needs     Medical: Not on file     Non-medical: Not on file   Tobacco Use    Smoking status: Current Every Day Smoker     Packs/day: 1.00    Smokeless tobacco: Never Used   Substance and Sexual Activity    Alcohol use:  Yes     Alcohol/week: 14.0 standard drinks     Types: 14 Cans of beer per week     Comment: daily vodka drinker     Drug use: Yes     Types: Marijuana     Comment: very seldom    Sexual activity: Not on file   Lifestyle    Physical activity     Days per week: Not on would not be her first choice given how severely displaced the shoulder is. I discussed this in length with patient and his 2 sons and patient still decided to leave [address] Patient was provided with a sling for his upper extremity and took it off immediately. I did give the son Dr. Granados Steffany information for outpatient follow-up. Son verbalized understanding. While in ED patient received   Medications   acetaminophen (TYLENOL) tablet 650 mg (has no administration in time range)             CONSULTS  IP CONSULT TO ORTHOPEDIC SURGERY    FOLLOW UP  Velma Machado, [address] [place] [address] [address]  [phone]    Call   follow up    [facility]  ED  [address] [facility] [address] [facility]          DISPOSITION  Followed up with Gail Luong on 6/27/2020 at 2125. Patient left the ED with a disposition of AMA on . Patient cited personal obligations as reason. Advised patient to follow up with a primary care physician or return to the Emergency Department if symptoms worsen. Roslyn Lunsford     1. Closed anterior dislocation of right humerus, initial encounter    2. Other closed displaced fracture of proximal end of right humerus, initial encounter            Comment: Please note this report has been produced using speech recognition software and may contain errors related to that system including errors in grammar, punctuation, and spelling, as well as words and phrases that may be inappropriate. If there are any questions or concerns please feel free to contact the dictating provider for clarification.         [address] Brandi Blount, APRN - CNP  06/27/20 5381

## 2020-06-28 NOTE — ED NOTES
Applied a L Duramedic sling to support the pt's injured right shoulder. Pt complained of discomfort in the neck so I attempted to adjust sling to a position of comfort. Nothing worked and the pt continued to grow boisterous about leaving. Son in room is adamant that the pt stays and gets the results of the X-ray and treatment. Destinee was notified.        Opal Willis  06/27/20 2055

## 2020-06-28 NOTE — H&P
Hospital Medicine History & Physical      PCP: No primary care provider on file. Date of Admission: 6/27/2020    Date of Service: Pt seen/examined on 06/28/2020 and  Placed in Observation. Chief Complaint:  Fall and R shoulder pain      History Of Present Illness:      Mr. Asya Chaves is a 49 yo with pmhx of etoh abuse who presents to [facility] today with fall and R shoulder pain that occurred two days ago evening. He reports he was out hiking with son and he fell on his R shoulder. He has been having severe pain and came to ER. Denies trauma to head. Denies being on anticoagulation. He is tremulous and admits to hx of DTs. He denies DT related seizures in past.     Past Medical History:      History reviewed. No pertinent past medical history. ETOH abuse    Past Surgical History:          Procedure Laterality Date    NERVE SURGERY Left     PERONEAL NERVE       Medications Prior to Admission:      Prior to Admission medications    Not on File     none  Allergies:  Patient has no known allergies. Social History:      The patient currently lives family    TOBACCO:   reports that he has been smoking. He has been smoking about 1.00 pack per day. He has never used smokeless tobacco.  ETOH:   reports current alcohol use of about 14.0 standard drinks of alcohol per week. E-Cigarettes Vaping or Juuling     Questions Responses    Vaping Use Never User    Start Date     Does device contain nicotine? Never    Quit Date     Vaping Type             Family History:      Reviewed in detail and negative for DM, CAD, Cancer, CVA. Positive as follows:    History reviewed. No pertinent family history. REVIEW OF SYSTEMS:   Pertinent positives as noted in the HPI. All other systems reviewed and negative.     PHYSICAL EXAM PERFORMED:    /85   Pulse 119   Temp 98.1 °F (36.7 °C) (Oral)   Resp 18   Ht 6' 3\" (1.905 m)   Wt 160 lb (72.6 kg)   SpO2 92%   BMI 20.00 kg/m²     General appearance:  No apparent distress, appears stated age and cooperative. Painful to move R arm  HEENT:  Normal cephalic, atraumatic without obvious deformity. Pupils equal, round, and reactive to light. Extra ocular muscles intact. Conjunctivae/corneas clear. Neck: Supple, with full range of motion. No jugular venous distention. Trachea midline. Respiratory:  Normal respiratory effort. Clear to auscultation, bilaterally without Rales/Wheezes/Rhonchi. Cardiovascular:  Regular rate and rhythm with normal S1/S2 without murmurs, rubs or gallops. Abdomen: Soft, non-tender, non-distended with normal bowel sounds. Musculoskeletal:  Painful R shoulder. R shoulder with ice pack. Skin: Skin color, texture, turgor normal.  No rashes or lesions. Neurologic:  Neurovascularly intact without any focal sensory/motor deficits. Cranial nerves: II-XII intact, grossly non-focal.  Psychiatric:  Alert and oriented, thought content appropriate, normal insight  Capillary Refill: Brisk,< 3 seconds   Peripheral Pulses: +2 palpable, equal bilaterally       Labs:     Recent Labs     06/27/20  2355   WBC 12.8*   HGB 14.3   HCT 41.4   *     Recent Labs     06/27/20  2355   *   K 3.8   CL 94*   CO2 18*   BUN 13   CREATININE 0.6*   CALCIUM 9.2     Recent Labs     06/27/20  2355   *   ALT 61*   BILITOT 0.4   ALKPHOS 79     Recent Labs     06/27/20  2355   INR 0.94     No results for input(s): Smith Sanches in the last 72 hours.     Urinalysis:      Lab Results   Component Value Date    NITRU Negative 09/19/2019    WBCUA 0-2 09/19/2019    BACTERIA 1+ 09/19/2019    RBCUA None seen 09/19/2019    BLOODU TRACE-INTACT 09/19/2019    SPECGRAV 1.015 09/19/2019    GLUCOSEU Negative 09/19/2019       Radiology:     R shoulder xray: I have reviewed the CXR with the following interpretation:     No orders to display       ASSESSMENT:    Active Hospital Problems    Diagnosis Date Noted    Closed fracture of proximal end of humerus [S42.209A] 06/28/2020    ETOH abuse [F10.10] 06/28/2020    Humeral head fracture, right, closed, initial encounter Ruth Eller 06/28/2020         PLAN:     Acute comminuted fx of proximal humerus - initial encounter  Ortho consult  NPO  Antiemetic, pain management    ETOH abuse  CIWA protocol  Monitor     Tobacco abuse  Nicotine patch     DVT Prophylaxis: SCD, holding off lovenox until ortho consult  Diet: Diet NPO Effective Now Exceptions are: Sips with Meds  Code Status: Full Code    PT/OT Eval Status: pending ortho consult    Dispo - likely observation <24h        Jodi Storm PA-C    Thank you No primary care provider on file. for the opportunity to be involved in this patient's care. If you have any questions or concerns please feel free to contact me at [phone].

## 2020-06-28 NOTE — ED NOTES
Patient identified as a positive fall risk on the ED triage fall screening. Patient placed in fall precautions which includes:  yellow fall risk bracelet on wrist, declined yellow socks on feet, \"Be Safe\" sign placed on patient's door, and bed alarm placed under patient/alarm turned on. Patient instructed on importance of not getting out of bed or ambulating without assistance for safety.           K[facility]zy [facility]  06/27/20 5656

## 2020-06-28 NOTE — ED NOTES
When going to waiting room to take pt back to his room, pt was caught trying to conceal a medium sized bottle of vodka in his sock and pant leg. Esequiel and I confronted pt and the pt handed the bottle to [facility] who returned the bottle to the pt's son.       Gusmiguel Cloudton  06/28/20 0000

## 2020-06-28 NOTE — ED PROVIDER NOTES
[facility]  ED  EMERGENCY DEPARTMENT ENCOUNTER      Pt Name: Javid Lai  MRN: [id number]  Abhaygfjose eduardo 1970  Date of evaluation: 6/27/2020  Provider: Etelvina Fowler MD    CHIEF COMPLAINT       Chief Complaint   Patient presents with    Shoulder Injury     patient returned to ED after discussing injury with family. Patient reports last drink minutes ago. HISTORY OF PRESENT ILLNESS   (Location/Symptom, Timing/Onset, Context/Setting, Quality, Duration, Modifying Factors, Severity)  Note limiting factors. Javid Lai is a 48 y.o. male with past medical history of alcoholism here today with right shoulder pain. Patient states that 2 days ago he was walking when he fell landed on his right arm. Was having pain in the right arm since that time. Seen earlier today and diagnosed with a right proximal humeral fracture dislocation and encouraged to stay, the patient refused and left AGAINST MEDICAL ADVICE. His family states \"we bribed him with vodka\" and he is now agreeable to stay in the hospital.    Patient is currently complaining of aching right-sided shoulder pain worse with any attempted movement. Denies hitting his head or loss of consciousness. States he drank vodka just prior to coming to the hospital.  Notes he feels tremulous and nauseous. [facility]    Nursing Notes were reviewed. REVIEW OF SYSTEMS    (2-9 systems for level 4, 10 or more for level 5)     Review of Systems    Please see HPI for pertinent positive and negative review of system findings. A full 10 system ROS was performed and otherwise negative. PAST MEDICAL HISTORY   History reviewed. No pertinent past medical history. SURGICAL HISTORY       Past Surgical History:   Procedure Laterality Date    NERVE SURGERY Left     PERONEAL NERVE         CURRENT MEDICATIONS       Previous Medications    No medications on file       ALLERGIES     Patient has no known allergies.     FAMILY HISTORY History reviewed. No pertinent family history. SOCIAL HISTORY       Social History     Socioeconomic History    Marital status:      Spouse name: None    Number of children: None    Years of education: None    Highest education level: None   Occupational History    None   Social Needs    Financial resource strain: None    Food insecurity     Worry: None     Inability: None    Transportation needs     Medical: None     Non-medical: None   Tobacco Use    Smoking status: Current Every Day Smoker     Packs/day: 1.00    Smokeless tobacco: Never Used   Substance and Sexual Activity    Alcohol use: Yes     Alcohol/week: 14.0 standard drinks     Types: 14 Cans of beer per week     Comment: daily vodka drinker     Drug use: Yes     Types: Marijuana     Comment: very seldom    Sexual activity: None   Lifestyle    Physical activity     Days per week: None     Minutes per session: None    Stress: None   Relationships    Social connections     Talks on phone: None     Gets together: None     Attends Confucianist service: None     Active member of club or organization: None     Attends meetings of clubs or organizations: None     Relationship status: None    Intimate partner violence     Fear of current or ex partner: None     Emotionally abused: None     Physically abused: None     Forced sexual activity: None   Other Topics Concern    None   Social History Narrative    None       SCREENINGS               PHYSICAL EXAM    (up to 7 for level 4, 8 or more for level 5)     ED Triage Vitals [06/27/20 2339]   BP Temp Temp Source Pulse Resp SpO2 Height Weight   (!) 145/95 98.1 °F (36.7 °C) Oral 129 20 94 % -- --       Physical Exam    General appearance:  Cooperative. No acute distress. Skin:  Warm. Dry. Eye:  Extraocular movements intact. Ears, nose, mouth and throat:  Oral mucosa moist,  Neck:  Trachea midline.      Heart: Tachycardic but regular  Perfusion:  intact  Respiratory:  Lungs clear to auscultation bilaterally. Respirations nonlabored. Abdominal:   Non distended. Nontender  Neurological:  Alert and oriented x 3. Moves all extremities spontaneously. Intact sensation throughout the entire right upper extremity including axillary distribution of the right arm. Resting tremor present. Musculoskeletal:   Right shoulder deformity with inability to range right shoulder secondary to pain. Normal flexion extension of right elbow.   Normal range of motion of the right wrist.          Psychiatric:  Normal mood      DIAGNOSTIC RESULTS       Labs Reviewed   CBC WITH AUTO DIFFERENTIAL - Abnormal; Notable for the following components:       Result Value    WBC 12.8 (*)     RBC 4.17 (*)     MCH 34.3 (*)     Platelets 559 (*)     Neutrophils Absolute 11.3 (*)     Lymphocytes Absolute 0.4 (*)     All other components within normal limits    Narrative:     Performed at:  [place] [address] [employer]   Phone [phone]   COMPREHENSIVE METABOLIC PANEL W/ REFLEX TO MG FOR LOW K - Abnormal; Notable for the following components:    Sodium 133 (*)     Chloride 94 (*)     CO2 18 (*)     Anion Gap 21 (*)     Glucose 165 (*)     CREATININE 0.6 (*)     ALT 61 (*)      (*)     All other components within normal limits    Narrative:     Performed at:  [place] [address] [employer]   Phone [phone]    Narrative:     Performed at:  [place] [address] [employer]   Phone [phone]   APTT    Narrative:     Performed at:  [place] [address] [employer]   Phone [phone]   ETHANOL   TYPE AND SCREEN       Interpretation per the Radiologist below, if obtained/available at the time of this note:    No orders to display       All other labs/imaging were within normal range or not returned as of this dictation. EMERGENCY DEPARTMENT COURSE and DIFFERENTIAL DIAGNOSIS/MDM:   Vitals:    Vitals:    06/27/20 2339   BP: (!) 145/95   Pulse: 129   Resp: 20   Temp: 98.1 °F (36.7 °C)   TempSrc: Oral   SpO2: 94%       Patient presents emergency department today with known right fracture dislocation of the shoulder. Now agreeable to stay. Orthopedics previously made recommendations to admit the patient for operative repair. He was admitted to the hospitalist.  Concern for alcohol withdrawal as he is hypertensive, tachycardic, tremulous. Started on benzodiazepines    MDM    CONSULTS     IP CONSULT TO SOCIAL WORK  IP CONSULT TO HOSPITALIST    Critical Care:   None    REASSESSMENT          PROCEDURE     Unless otherwise noted below, none     Procedures      FINAL IMPRESSION      1. Anterior dislocation of right humerus, initial encounter    2. Alcohol withdrawal syndrome with complication [facility])            DISPOSITION/PLAN   DISPOSITION Decision To Admit 06/28/2020 12:38:39 AM        PATIENT REFERRED TO:  No follow-up provider specified. DISCHARGE MEDICATIONS:  New Prescriptions    No medications on file     Controlled Substances Monitoring:     No flowsheet data found.     (Please note that portions of this note were completed with a voice recognition program.  Efforts were made to edit the dictations but occasionally words are mis-transcribed.)    Chiquis Trevino MD (electronically signed)  Attending Emergency Physician            Yeimi Dominguez MD  06/28/20 1707

## 2020-06-28 NOTE — PROGRESS NOTES
RN received call from this room with son stating \"he fell over\"     RN went into room found patient sitting on bed. Patient previously was calling out appropriately and using urinal in bed d/t weakness and tremors. Patient denies hitting his head or any new pain at this time. Vitals are stable. Bed alarm replaced. Encouraged patient to call for assistance. MD was notified. RN will continue to monitor.

## 2020-06-28 NOTE — ED NOTES
Bita Méndez with hospitalist  at bedside     [facility], [address] [facility]  06/28/20 7742

## 2020-06-28 NOTE — ED NOTES
MD Alanna Christensen at bedside     Elma Smith, [facility]  06/28/20 8801

## 2020-06-29 ENCOUNTER — APPOINTMENT (OUTPATIENT)
Dept: GENERAL RADIOLOGY | Age: 50
DRG: 322 | End: 2020-06-29
Payer: COMMERCIAL

## 2020-06-29 ENCOUNTER — ANESTHESIA EVENT (OUTPATIENT)
Dept: OPERATING ROOM | Age: 50
DRG: 322 | End: 2020-06-29
Payer: COMMERCIAL

## 2020-06-29 ENCOUNTER — ANESTHESIA (OUTPATIENT)
Dept: OPERATING ROOM | Age: 50
DRG: 322 | End: 2020-06-29
Payer: COMMERCIAL

## 2020-06-29 VITALS
DIASTOLIC BLOOD PRESSURE: 76 MMHG | TEMPERATURE: 95.8 F | SYSTOLIC BLOOD PRESSURE: 111 MMHG | RESPIRATION RATE: 1 BRPM | OXYGEN SATURATION: 100 %

## 2020-06-29 LAB
ANION GAP SERPL CALCULATED.3IONS-SCNC: 11 MMOL/L (ref 3–16)
BUN BLDV-MCNC: 10 MG/DL (ref 7–20)
CALCIUM SERPL-MCNC: 8.9 MG/DL (ref 8.3–10.6)
CHLORIDE BLD-SCNC: 94 MMOL/L (ref 99–110)
CO2: 25 MMOL/L (ref 21–32)
CREAT SERPL-MCNC: 0.6 MG/DL (ref 0.9–1.3)
ETHANOL: NORMAL MG/DL (ref 0–0.08)
GFR AFRICAN AMERICAN: >60
GFR NON-AFRICAN AMERICAN: >60
GLUCOSE BLD-MCNC: 102 MG/DL (ref 70–99)
HCT VFR BLD CALC: 28.9 % (ref 40.5–52.5)
HCT VFR BLD CALC: 34.2 % (ref 40.5–52.5)
HEMOGLOBIN: 11.9 G/DL (ref 13.5–17.5)
HEMOGLOBIN: 9.9 G/DL (ref 13.5–17.5)
INR BLD: 0.97 (ref 0.86–1.14)
MCH RBC QN AUTO: 34.8 PG (ref 26–34)
MCH RBC QN AUTO: 35 PG (ref 26–34)
MCHC RBC AUTO-ENTMCNC: 34.2 G/DL (ref 31–36)
MCHC RBC AUTO-ENTMCNC: 34.7 G/DL (ref 31–36)
MCV RBC AUTO: 100.5 FL (ref 80–100)
MCV RBC AUTO: 102.5 FL (ref 80–100)
PDW BLD-RTO: 13.6 % (ref 12.4–15.4)
PDW BLD-RTO: 13.6 % (ref 12.4–15.4)
PLATELET # BLD: 61 K/UL (ref 135–450)
PLATELET # BLD: 69 K/UL (ref 135–450)
PLATELET SLIDE REVIEW: ABNORMAL
PMV BLD AUTO: 9.2 FL (ref 5–10.5)
PMV BLD AUTO: 9.8 FL (ref 5–10.5)
POTASSIUM SERPL-SCNC: 3.4 MMOL/L (ref 3.5–5.1)
PROTHROMBIN TIME: 11.3 SEC (ref 10–13.2)
RBC # BLD: 2.82 M/UL (ref 4.2–5.9)
RBC # BLD: 3.4 M/UL (ref 4.2–5.9)
REASON FOR REJECTION: NORMAL
REJECTED TEST: NORMAL
SLIDE REVIEW: ABNORMAL
SODIUM BLD-SCNC: 130 MMOL/L (ref 136–145)
WBC # BLD: 10 K/UL (ref 4–11)
WBC # BLD: 9.4 K/UL (ref 4–11)

## 2020-06-29 PROCEDURE — 2720000010 HC SURG SUPPLY STERILE: Performed by: ORTHOPAEDIC SURGERY

## 2020-06-29 PROCEDURE — 6370000000 HC RX 637 (ALT 250 FOR IP): Performed by: ORTHOPAEDIC SURGERY

## 2020-06-29 PROCEDURE — 6360000002 HC RX W HCPCS: Performed by: NURSE ANESTHETIST, CERTIFIED REGISTERED

## 2020-06-29 PROCEDURE — 85610 PROTHROMBIN TIME: CPT

## 2020-06-29 PROCEDURE — 2500000003 HC RX 250 WO HCPCS: Performed by: NURSE ANESTHETIST, CERTIFIED REGISTERED

## 2020-06-29 PROCEDURE — C1776 JOINT DEVICE (IMPLANTABLE): HCPCS | Performed by: ORTHOPAEDIC SURGERY

## 2020-06-29 PROCEDURE — 6360000002 HC RX W HCPCS: Performed by: INTERNAL MEDICINE

## 2020-06-29 PROCEDURE — 6370000000 HC RX 637 (ALT 250 FOR IP): Performed by: EMERGENCY MEDICINE

## 2020-06-29 PROCEDURE — 2580000003 HC RX 258: Performed by: EMERGENCY MEDICINE

## 2020-06-29 PROCEDURE — G0378 HOSPITAL OBSERVATION PER HR: HCPCS

## 2020-06-29 PROCEDURE — 3700000000 HC ANESTHESIA ATTENDED CARE: Performed by: ORTHOPAEDIC SURGERY

## 2020-06-29 PROCEDURE — 7100000001 HC PACU RECOVERY - ADDTL 15 MIN: Performed by: ORTHOPAEDIC SURGERY

## 2020-06-29 PROCEDURE — 2500000003 HC RX 250 WO HCPCS: Performed by: INTERNAL MEDICINE

## 2020-06-29 PROCEDURE — C1713 ANCHOR/SCREW BN/BN,TIS/BN: HCPCS | Performed by: ORTHOPAEDIC SURGERY

## 2020-06-29 PROCEDURE — 6370000000 HC RX 637 (ALT 250 FOR IP): Performed by: PEDIATRICS

## 2020-06-29 PROCEDURE — 2580000003 HC RX 258: Performed by: ORTHOPAEDIC SURGERY

## 2020-06-29 PROCEDURE — 6360000002 HC RX W HCPCS: Performed by: EMERGENCY MEDICINE

## 2020-06-29 PROCEDURE — 6360000002 HC RX W HCPCS: Performed by: PHYSICIAN ASSISTANT

## 2020-06-29 PROCEDURE — 0RRJ00Z REPLACEMENT OF RIGHT SHOULDER JOINT WITH REVERSE BALL AND SOCKET SYNTHETIC SUBSTITUTE, OPEN APPROACH: ICD-10-PCS | Performed by: ORTHOPAEDIC SURGERY

## 2020-06-29 PROCEDURE — 64415 NJX AA&/STRD BRCH PLXS IMG: CPT | Performed by: ANESTHESIOLOGY

## 2020-06-29 PROCEDURE — 3E0T3BZ INTRODUCTION OF ANESTHETIC AGENT INTO PERIPHERAL NERVES AND PLEXI, PERCUTANEOUS APPROACH: ICD-10-PCS | Performed by: ANESTHESIOLOGY

## 2020-06-29 PROCEDURE — 6360000002 HC RX W HCPCS: Performed by: ORTHOPAEDIC SURGERY

## 2020-06-29 PROCEDURE — 2580000003 HC RX 258: Performed by: NURSE ANESTHETIST, CERTIFIED REGISTERED

## 2020-06-29 PROCEDURE — 2700000000 HC OXYGEN THERAPY PER DAY

## 2020-06-29 PROCEDURE — 3600000005 HC SURGERY LEVEL 5 BASE: Performed by: ORTHOPAEDIC SURGERY

## 2020-06-29 PROCEDURE — L3670 SO ACRO/CLAV CAN WEB PRE OTS: HCPCS | Performed by: ORTHOPAEDIC SURGERY

## 2020-06-29 PROCEDURE — C1769 GUIDE WIRE: HCPCS | Performed by: ORTHOPAEDIC SURGERY

## 2020-06-29 PROCEDURE — 3700000001 HC ADD 15 MINUTES (ANESTHESIA): Performed by: ORTHOPAEDIC SURGERY

## 2020-06-29 PROCEDURE — 36415 COLL VENOUS BLD VENIPUNCTURE: CPT

## 2020-06-29 PROCEDURE — 73030 X-RAY EXAM OF SHOULDER: CPT

## 2020-06-29 PROCEDURE — 3600000015 HC SURGERY LEVEL 5 ADDTL 15MIN: Performed by: ORTHOPAEDIC SURGERY

## 2020-06-29 PROCEDURE — 2580000003 HC RX 258: Performed by: PHYSICIAN ASSISTANT

## 2020-06-29 PROCEDURE — 85027 COMPLETE CBC AUTOMATED: CPT

## 2020-06-29 PROCEDURE — 94761 N-INVAS EAR/PLS OXIMETRY MLT: CPT

## 2020-06-29 PROCEDURE — 2580000003 HC RX 258: Performed by: INTERNAL MEDICINE

## 2020-06-29 PROCEDURE — 7100000000 HC PACU RECOVERY - FIRST 15 MIN: Performed by: ORTHOPAEDIC SURGERY

## 2020-06-29 PROCEDURE — 6360000002 HC RX W HCPCS: Performed by: ANESTHESIOLOGY

## 2020-06-29 PROCEDURE — G0480 DRUG TEST DEF 1-7 CLASSES: HCPCS

## 2020-06-29 PROCEDURE — 2709999900 HC NON-CHARGEABLE SUPPLY: Performed by: ORTHOPAEDIC SURGERY

## 2020-06-29 PROCEDURE — 3209999900 FLUORO FOR SURGICAL PROCEDURES

## 2020-06-29 PROCEDURE — 80048 BASIC METABOLIC PNL TOTAL CA: CPT

## 2020-06-29 PROCEDURE — 0LS30ZZ REPOSITION RIGHT UPPER ARM TENDON, OPEN APPROACH: ICD-10-PCS | Performed by: ORTHOPAEDIC SURGERY

## 2020-06-29 DEVICE — RFX HUMERAL STEM
Type: IMPLANTABLE DEVICE | Site: SHOULDER | Status: FUNCTIONAL
Brand: REUNION

## 2020-06-29 DEVICE — 4.5MM PERIPHERAL SCREW
Type: IMPLANTABLE DEVICE | Site: SHOULDER | Status: FUNCTIONAL
Brand: REUNION

## 2020-06-29 DEVICE — HUMERAL CUP
Type: IMPLANTABLE DEVICE | Site: SHOULDER | Status: FUNCTIONAL
Brand: REUNION

## 2020-06-29 DEVICE — CEMENT BNE 40 GM RADIOPAQUE BA SIMPLEX P: Type: IMPLANTABLE DEVICE | Site: SHOULDER | Status: FUNCTIONAL

## 2020-06-29 DEVICE — BASEPLATE
Type: IMPLANTABLE DEVICE | Site: SHOULDER | Status: FUNCTIONAL
Brand: REUNION

## 2020-06-29 DEVICE — GLENOSPHERE , CONCENTRIC
Type: IMPLANTABLE DEVICE | Site: SHOULDER | Status: FUNCTIONAL
Brand: REUNION

## 2020-06-29 DEVICE — 6.5MM CENTER SCREW
Type: IMPLANTABLE DEVICE | Site: SHOULDER | Status: FUNCTIONAL
Brand: REUNION

## 2020-06-29 RX ORDER — SODIUM CHLORIDE, SODIUM LACTATE, POTASSIUM CHLORIDE, CALCIUM CHLORIDE 600; 310; 30; 20 MG/100ML; MG/100ML; MG/100ML; MG/100ML
INJECTION, SOLUTION INTRAVENOUS CONTINUOUS PRN
Status: DISCONTINUED | OUTPATIENT
Start: 2020-06-29 | End: 2020-06-29 | Stop reason: SDUPTHER

## 2020-06-29 RX ORDER — OXYCODONE HYDROCHLORIDE AND ACETAMINOPHEN 5; 325 MG/1; MG/1
1 TABLET ORAL PRN
Status: DISCONTINUED | OUTPATIENT
Start: 2020-06-29 | End: 2020-06-29 | Stop reason: HOSPADM

## 2020-06-29 RX ORDER — LORAZEPAM 2 MG/ML
0.25 INJECTION INTRAMUSCULAR ONCE
Status: COMPLETED | OUTPATIENT
Start: 2020-06-29 | End: 2020-06-29

## 2020-06-29 RX ORDER — SENNA AND DOCUSATE SODIUM 50; 8.6 MG/1; MG/1
1 TABLET, FILM COATED ORAL 2 TIMES DAILY
Status: DISCONTINUED | OUTPATIENT
Start: 2020-06-29 | End: 2020-07-04 | Stop reason: HOSPADM

## 2020-06-29 RX ORDER — ACETAMINOPHEN 325 MG/1
650 TABLET ORAL EVERY 6 HOURS
Status: DISCONTINUED | OUTPATIENT
Start: 2020-06-29 | End: 2020-07-04 | Stop reason: HOSPADM

## 2020-06-29 RX ORDER — SODIUM CHLORIDE 0.9 % (FLUSH) 0.9 %
10 SYRINGE (ML) INJECTION EVERY 12 HOURS SCHEDULED
Status: DISCONTINUED | OUTPATIENT
Start: 2020-06-29 | End: 2020-06-29 | Stop reason: SDUPTHER

## 2020-06-29 RX ORDER — LABETALOL HYDROCHLORIDE 5 MG/ML
5 INJECTION, SOLUTION INTRAVENOUS
Status: DISCONTINUED | OUTPATIENT
Start: 2020-06-29 | End: 2020-06-29 | Stop reason: HOSPADM

## 2020-06-29 RX ORDER — ROCURONIUM BROMIDE 10 MG/ML
INJECTION, SOLUTION INTRAVENOUS PRN
Status: DISCONTINUED | OUTPATIENT
Start: 2020-06-29 | End: 2020-06-29 | Stop reason: SDUPTHER

## 2020-06-29 RX ORDER — SODIUM CHLORIDE 9 MG/ML
INJECTION, SOLUTION INTRAVENOUS
Status: COMPLETED
Start: 2020-06-29 | End: 2020-06-29

## 2020-06-29 RX ORDER — SODIUM CHLORIDE 9 MG/ML
INJECTION, SOLUTION INTRAVENOUS
Status: DISPENSED
Start: 2020-06-29 | End: 2020-06-30

## 2020-06-29 RX ORDER — HYDRALAZINE HYDROCHLORIDE 20 MG/ML
5 INJECTION INTRAMUSCULAR; INTRAVENOUS EVERY 30 MIN PRN
Status: DISCONTINUED | OUTPATIENT
Start: 2020-06-29 | End: 2020-06-29 | Stop reason: HOSPADM

## 2020-06-29 RX ORDER — LIDOCAINE HYDROCHLORIDE 20 MG/ML
INJECTION, SOLUTION EPIDURAL; INFILTRATION; INTRACAUDAL; PERINEURAL PRN
Status: DISCONTINUED | OUTPATIENT
Start: 2020-06-29 | End: 2020-06-29 | Stop reason: SDUPTHER

## 2020-06-29 RX ORDER — SODIUM CHLORIDE 0.9 % (FLUSH) 0.9 %
10 SYRINGE (ML) INJECTION PRN
Status: DISCONTINUED | OUTPATIENT
Start: 2020-06-29 | End: 2020-06-29 | Stop reason: SDUPTHER

## 2020-06-29 RX ORDER — FENTANYL CITRATE 50 UG/ML
INJECTION, SOLUTION INTRAMUSCULAR; INTRAVENOUS PRN
Status: DISCONTINUED | OUTPATIENT
Start: 2020-06-29 | End: 2020-06-29 | Stop reason: SDUPTHER

## 2020-06-29 RX ORDER — SODIUM CHLORIDE, SODIUM LACTATE, POTASSIUM CHLORIDE, CALCIUM CHLORIDE 600; 310; 30; 20 MG/100ML; MG/100ML; MG/100ML; MG/100ML
INJECTION, SOLUTION INTRAVENOUS
Status: DISPENSED
Start: 2020-06-29 | End: 2020-06-30

## 2020-06-29 RX ORDER — ONDANSETRON 2 MG/ML
4 INJECTION INTRAMUSCULAR; INTRAVENOUS EVERY 30 MIN PRN
Status: DISCONTINUED | OUTPATIENT
Start: 2020-06-29 | End: 2020-06-29 | Stop reason: HOSPADM

## 2020-06-29 RX ORDER — PROPOFOL 10 MG/ML
INJECTION, EMULSION INTRAVENOUS PRN
Status: DISCONTINUED | OUTPATIENT
Start: 2020-06-29 | End: 2020-06-29 | Stop reason: SDUPTHER

## 2020-06-29 RX ORDER — OXYCODONE HYDROCHLORIDE AND ACETAMINOPHEN 5; 325 MG/1; MG/1
2 TABLET ORAL PRN
Status: DISCONTINUED | OUTPATIENT
Start: 2020-06-29 | End: 2020-06-29 | Stop reason: HOSPADM

## 2020-06-29 RX ORDER — DIPHENHYDRAMINE HYDROCHLORIDE 50 MG/ML
6.25 INJECTION INTRAMUSCULAR; INTRAVENOUS
Status: DISCONTINUED | OUTPATIENT
Start: 2020-06-29 | End: 2020-06-29 | Stop reason: HOSPADM

## 2020-06-29 RX ORDER — ESMOLOL HYDROCHLORIDE 10 MG/ML
INJECTION INTRAVENOUS PRN
Status: DISCONTINUED | OUTPATIENT
Start: 2020-06-29 | End: 2020-06-29 | Stop reason: SDUPTHER

## 2020-06-29 RX ADMIN — FENTANYL CITRATE 75 MCG: 50 INJECTION, SOLUTION INTRAMUSCULAR; INTRAVENOUS at 11:47

## 2020-06-29 RX ADMIN — ROCURONIUM BROMIDE 50 MG: 10 SOLUTION INTRAVENOUS at 11:41

## 2020-06-29 RX ADMIN — SODIUM CHLORIDE 500 ML: 9 INJECTION, SOLUTION INTRAVENOUS at 21:22

## 2020-06-29 RX ADMIN — FENTANYL CITRATE 50 MCG: 50 INJECTION, SOLUTION INTRAMUSCULAR; INTRAVENOUS at 15:18

## 2020-06-29 RX ADMIN — SENNOSIDES AND DOCUSATE SODIUM 1 TABLET: 8.6; 5 TABLET ORAL at 19:46

## 2020-06-29 RX ADMIN — ROCURONIUM BROMIDE 20 MG: 10 SOLUTION INTRAVENOUS at 12:53

## 2020-06-29 RX ADMIN — LORAZEPAM 2 MG: 1 TABLET ORAL at 22:51

## 2020-06-29 RX ADMIN — SODIUM CHLORIDE, POTASSIUM CHLORIDE, SODIUM LACTATE AND CALCIUM CHLORIDE: 600; 310; 30; 20 INJECTION, SOLUTION INTRAVENOUS at 14:37

## 2020-06-29 RX ADMIN — OXYCODONE HYDROCHLORIDE AND ACETAMINOPHEN 2 TABLET: 5; 325 TABLET ORAL at 04:22

## 2020-06-29 RX ADMIN — FENTANYL CITRATE 50 MCG: 50 INJECTION, SOLUTION INTRAMUSCULAR; INTRAVENOUS at 11:41

## 2020-06-29 RX ADMIN — LORAZEPAM 3 MG: 1 TABLET ORAL at 04:22

## 2020-06-29 RX ADMIN — FOLIC ACID 200 ML: 5 INJECTION, SOLUTION INTRAMUSCULAR; INTRAVENOUS; SUBCUTANEOUS at 11:33

## 2020-06-29 RX ADMIN — ESMOLOL HYDROCHLORIDE 5 MG: 10 INJECTION, SOLUTION INTRAVENOUS at 11:47

## 2020-06-29 RX ADMIN — PROPOFOL 200 MG: 10 INJECTION, EMULSION INTRAVENOUS at 11:41

## 2020-06-29 RX ADMIN — LORAZEPAM 0.25 MG: 2 INJECTION, SOLUTION INTRAMUSCULAR; INTRAVENOUS at 16:29

## 2020-06-29 RX ADMIN — LORAZEPAM 1 MG: 1 TABLET ORAL at 19:46

## 2020-06-29 RX ADMIN — LORAZEPAM 2 MG: 1 TABLET ORAL at 21:22

## 2020-06-29 RX ADMIN — ROCURONIUM BROMIDE 15 MG: 10 SOLUTION INTRAVENOUS at 14:32

## 2020-06-29 RX ADMIN — Medication 10 ML: at 08:20

## 2020-06-29 RX ADMIN — ROCURONIUM BROMIDE 20 MG: 10 SOLUTION INTRAVENOUS at 13:30

## 2020-06-29 RX ADMIN — LORAZEPAM 2 MG: 1 TABLET ORAL at 06:55

## 2020-06-29 RX ADMIN — OXYCODONE HYDROCHLORIDE AND ACETAMINOPHEN 2 TABLET: 5; 325 TABLET ORAL at 21:27

## 2020-06-29 RX ADMIN — ESMOLOL HYDROCHLORIDE 5 MG: 10 INJECTION, SOLUTION INTRAVENOUS at 11:54

## 2020-06-29 RX ADMIN — ROCURONIUM BROMIDE 5 MG: 10 SOLUTION INTRAVENOUS at 14:08

## 2020-06-29 RX ADMIN — SUGAMMADEX 200 MG: 100 INJECTION, SOLUTION INTRAVENOUS at 15:18

## 2020-06-29 RX ADMIN — Medication 10 ML: at 08:21

## 2020-06-29 RX ADMIN — LIDOCAINE HYDROCHLORIDE 50 MG: 20 INJECTION, SOLUTION EPIDURAL; INFILTRATION; INTRACAUDAL; PERINEURAL at 11:41

## 2020-06-29 RX ADMIN — FENTANYL CITRATE 25 MCG: 50 INJECTION, SOLUTION INTRAMUSCULAR; INTRAVENOUS at 14:10

## 2020-06-29 RX ADMIN — LORAZEPAM 2 MG: 1 TABLET ORAL at 05:45

## 2020-06-29 RX ADMIN — FENTANYL CITRATE 50 MCG: 50 INJECTION, SOLUTION INTRAMUSCULAR; INTRAVENOUS at 14:32

## 2020-06-29 RX ADMIN — CEFAZOLIN 2 G: 10 INJECTION, POWDER, FOR SOLUTION INTRAVENOUS at 11:37

## 2020-06-29 RX ADMIN — SODIUM CHLORIDE, POTASSIUM CHLORIDE, SODIUM LACTATE AND CALCIUM CHLORIDE: 600; 310; 30; 20 INJECTION, SOLUTION INTRAVENOUS at 12:08

## 2020-06-29 RX ADMIN — CEFAZOLIN 1 G: 10 INJECTION, POWDER, FOR SOLUTION INTRAVENOUS at 14:21

## 2020-06-29 RX ADMIN — FOLIC ACID: 5 INJECTION, SOLUTION INTRAMUSCULAR; INTRAVENOUS; SUBCUTANEOUS at 10:38

## 2020-06-29 RX ADMIN — CEFAZOLIN SODIUM 2 G: 1 INJECTION, POWDER, FOR SOLUTION INTRAMUSCULAR; INTRAVENOUS at 21:22

## 2020-06-29 RX ADMIN — LORAZEPAM 2 MG: 2 INJECTION, SOLUTION INTRAMUSCULAR; INTRAVENOUS at 10:04

## 2020-06-29 ASSESSMENT — PULMONARY FUNCTION TESTS
PIF_VALUE: 16
PIF_VALUE: 18
PIF_VALUE: 16
PIF_VALUE: 17
PIF_VALUE: 18
PIF_VALUE: 16
PIF_VALUE: 1
PIF_VALUE: 3
PIF_VALUE: 17
PIF_VALUE: 17
PIF_VALUE: 16
PIF_VALUE: 14
PIF_VALUE: 16
PIF_VALUE: 15
PIF_VALUE: 17
PIF_VALUE: 3
PIF_VALUE: 17
PIF_VALUE: 18
PIF_VALUE: 18
PIF_VALUE: 4
PIF_VALUE: 16
PIF_VALUE: 17
PIF_VALUE: 16
PIF_VALUE: 3
PIF_VALUE: 17
PIF_VALUE: 16
PIF_VALUE: 18
PIF_VALUE: 1
PIF_VALUE: 17
PIF_VALUE: 5
PIF_VALUE: 17
PIF_VALUE: 17
PIF_VALUE: 18
PIF_VALUE: 17
PIF_VALUE: 16
PIF_VALUE: 2
PIF_VALUE: 16
PIF_VALUE: 17
PIF_VALUE: 1
PIF_VALUE: 17
PIF_VALUE: 16
PIF_VALUE: 16
PIF_VALUE: 18
PIF_VALUE: 1
PIF_VALUE: 18
PIF_VALUE: 17
PIF_VALUE: 22
PIF_VALUE: 0
PIF_VALUE: 15
PIF_VALUE: 17
PIF_VALUE: 0
PIF_VALUE: 2
PIF_VALUE: 17
PIF_VALUE: 16
PIF_VALUE: 17
PIF_VALUE: 17
PIF_VALUE: 13
PIF_VALUE: 2
PIF_VALUE: 17
PIF_VALUE: 16
PIF_VALUE: 18
PIF_VALUE: 16
PIF_VALUE: 13
PIF_VALUE: 16
PIF_VALUE: 16
PIF_VALUE: 3
PIF_VALUE: 5
PIF_VALUE: 16
PIF_VALUE: 17
PIF_VALUE: 14
PIF_VALUE: 16
PIF_VALUE: 20
PIF_VALUE: 18
PIF_VALUE: 17
PIF_VALUE: 3
PIF_VALUE: 15
PIF_VALUE: 3
PIF_VALUE: 17
PIF_VALUE: 16
PIF_VALUE: 0
PIF_VALUE: 15
PIF_VALUE: 16
PIF_VALUE: 15
PIF_VALUE: 16
PIF_VALUE: 16
PIF_VALUE: 17
PIF_VALUE: 8
PIF_VALUE: 14
PIF_VALUE: 3
PIF_VALUE: 16
PIF_VALUE: 18
PIF_VALUE: 3
PIF_VALUE: 17
PIF_VALUE: 18
PIF_VALUE: 18
PIF_VALUE: 0
PIF_VALUE: 17
PIF_VALUE: 16
PIF_VALUE: 17
PIF_VALUE: 16
PIF_VALUE: 16
PIF_VALUE: 17
PIF_VALUE: 16
PIF_VALUE: 17
PIF_VALUE: 14
PIF_VALUE: 18
PIF_VALUE: 16
PIF_VALUE: 13
PIF_VALUE: 17
PIF_VALUE: 16
PIF_VALUE: 17
PIF_VALUE: 16
PIF_VALUE: 2
PIF_VALUE: 16
PIF_VALUE: 16
PIF_VALUE: 17
PIF_VALUE: 16
PIF_VALUE: 18
PIF_VALUE: 16
PIF_VALUE: 17
PIF_VALUE: 16
PIF_VALUE: 17
PIF_VALUE: 7
PIF_VALUE: 3
PIF_VALUE: 17
PIF_VALUE: 17
PIF_VALUE: 16
PIF_VALUE: 17
PIF_VALUE: 16
PIF_VALUE: 1
PIF_VALUE: 16
PIF_VALUE: 15
PIF_VALUE: 15
PIF_VALUE: 17
PIF_VALUE: 15
PIF_VALUE: 16
PIF_VALUE: 17
PIF_VALUE: 16
PIF_VALUE: 15
PIF_VALUE: 18
PIF_VALUE: 17
PIF_VALUE: 16
PIF_VALUE: 18
PIF_VALUE: 16
PIF_VALUE: 17
PIF_VALUE: 17
PIF_VALUE: 18
PIF_VALUE: 17
PIF_VALUE: 3
PIF_VALUE: 17
PIF_VALUE: 16
PIF_VALUE: 18
PIF_VALUE: 17
PIF_VALUE: 17
PIF_VALUE: 16
PIF_VALUE: 16
PIF_VALUE: 17
PIF_VALUE: 17
PIF_VALUE: 18
PIF_VALUE: 17
PIF_VALUE: 15
PIF_VALUE: 17
PIF_VALUE: 17
PIF_VALUE: 16
PIF_VALUE: 18
PIF_VALUE: 18
PIF_VALUE: 16
PIF_VALUE: 17
PIF_VALUE: 18
PIF_VALUE: 15
PIF_VALUE: 3
PIF_VALUE: 16
PIF_VALUE: 16
PIF_VALUE: 17
PIF_VALUE: 16
PIF_VALUE: 4
PIF_VALUE: 1
PIF_VALUE: 16
PIF_VALUE: 14
PIF_VALUE: 18
PIF_VALUE: 18
PIF_VALUE: 17
PIF_VALUE: 17
PIF_VALUE: 16
PIF_VALUE: 2
PIF_VALUE: 15
PIF_VALUE: 22
PIF_VALUE: 22
PIF_VALUE: 3
PIF_VALUE: 4
PIF_VALUE: 16
PIF_VALUE: 13
PIF_VALUE: 17
PIF_VALUE: 17
PIF_VALUE: 16
PIF_VALUE: 15
PIF_VALUE: 16
PIF_VALUE: 10
PIF_VALUE: 18
PIF_VALUE: 17
PIF_VALUE: 16
PIF_VALUE: 16
PIF_VALUE: 2
PIF_VALUE: 17
PIF_VALUE: 16
PIF_VALUE: 17
PIF_VALUE: 17
PIF_VALUE: 16
PIF_VALUE: 17
PIF_VALUE: 8
PIF_VALUE: 16
PIF_VALUE: 17
PIF_VALUE: 0
PIF_VALUE: 16
PIF_VALUE: 22
PIF_VALUE: 17
PIF_VALUE: 15
PIF_VALUE: 17
PIF_VALUE: 16
PIF_VALUE: 15
PIF_VALUE: 16
PIF_VALUE: 17
PIF_VALUE: 15
PIF_VALUE: 0
PIF_VALUE: 18
PIF_VALUE: 16
PIF_VALUE: 16
PIF_VALUE: 17
PIF_VALUE: 16
PIF_VALUE: 16
PIF_VALUE: 17
PIF_VALUE: 17
PIF_VALUE: 16
PIF_VALUE: 17
PIF_VALUE: 17
PIF_VALUE: 16

## 2020-06-29 ASSESSMENT — PAIN SCALES - GENERAL
PAINLEVEL_OUTOF10: 2
PAINLEVEL_OUTOF10: 0
PAINLEVEL_OUTOF10: 10
PAINLEVEL_OUTOF10: 9
PAINLEVEL_OUTOF10: 0
PAINLEVEL_OUTOF10: 1
PAINLEVEL_OUTOF10: 0

## 2020-06-29 ASSESSMENT — PAIN DESCRIPTION - PAIN TYPE: TYPE: SURGICAL PAIN

## 2020-06-29 ASSESSMENT — LIFESTYLE VARIABLES: SMOKING_STATUS: 1

## 2020-06-29 ASSESSMENT — PAIN DESCRIPTION - ORIENTATION: ORIENTATION: RIGHT

## 2020-06-29 ASSESSMENT — PAIN DESCRIPTION - LOCATION: LOCATION: SHOULDER

## 2020-06-29 NOTE — PROGRESS NOTES
Hospitalist Progress Note      PCP: No primary care provider on file. Date of Admission: 6/27/2020    Chief Complaint: Fall and right shoulder pain    Hospital Course: See H&P    Subjective:   Patient is up in bed, comfortable, not in distress. Complaining of right shoulder pain. No new event overnight noted. Medications:  Reviewed    Infusion Medications   Scheduled Medications    nicotine  1 patch Transdermal Daily    sodium chloride flush  10 mL Intravenous 2 times per day    folic acid, thiamine, multi-vitamin with vitamin K infusion   Intravenous Daily    ceFAZolin  2 g Intravenous On Call to OR    sodium chloride flush  10 mL Intravenous 2 times per day     PRN Meds: HYDROmorphone, HYDROmorphone, oxyCODONE-acetaminophen **OR** oxyCODONE-acetaminophen, diphenhydrAMINE, ondansetron, labetalol, hydrALAZINE, meperidine, sodium chloride flush, acetaminophen **OR** acetaminophen, polyethylene glycol, promethazine **OR** ondansetron, ibuprofen, oxyCODONE-acetaminophen **OR** oxyCODONE-acetaminophen, HYDROmorphone, sodium chloride flush, LORazepam **OR** LORazepam **OR** LORazepam **OR** LORazepam **OR** LORazepam **OR** LORazepam **OR** LORazepam **OR** LORazepam      Intake/Output Summary (Last 24 hours) at 6/29/2020 1120  Last data filed at 6/29/2020 1000  Gross per 24 hour   Intake 10 ml   Output 100 ml   Net -90 ml       Physical Exam Performed:    BP (!) 141/78   Pulse 117   Temp 98.6 °F (37 °C) (Oral)   Resp 16   Ht 6' 3\" (1.905 m)   Wt 160 lb (72.6 kg)   SpO2 91%   BMI 20.00 kg/m²     General appearance: No apparent distress, appears stated age and cooperative. HEENT: Pupils equal, round, and reactive to light. Conjunctivae/corneas clear. Neck: Supple, with full range of motion. No jugular venous distention. Trachea midline. Respiratory:  Normal respiratory effort. Clear to auscultation, bilaterally without Rales/Wheezes/Rhonchi.   Cardiovascular: Regular rate and rhythm with normal S1/S2 without murmurs, rubs or gallops. Abdomen: Soft, non-tender, non-distended with normal bowel sounds. Musculoskeletal: No clubbing, cyanosis or edema bilaterally. Right shoulder pain. Skin: Skin color, texture, turgor normal.  No rashes or lesions. Neurologic:  Neurovascularly intact without any focal sensory/motor deficits. Cranial nerves: II-XII intact, grossly non-focal.  Psychiatric: Alert and oriented, thought content appropriate, normal insight  Capillary Refill: Brisk,< 3 seconds   Peripheral Pulses: +2 palpable, equal bilaterally       Labs:   Recent Labs     06/27/20 2355 06/29/20  0909   WBC 12.8* 10.0   HGB 14.3 11.9*   HCT 41.4 34.2*   * 69*     Recent Labs     06/27/20 2355 06/29/20  0909   * 130*   K 3.8 3.4*   CL 94* 94*   CO2 18* 25   BUN 13 10   CREATININE 0.6* 0.6*   CALCIUM 9.2 8.9     Recent Labs     06/27/20 2355   *   ALT 61*   BILITOT 0.4   ALKPHOS 79     Recent Labs     06/27/20 2355 06/29/20  0909   INR 0.94 0.97     No results for input(s): CKTOTAL, TROPONINI in the last 72 hours.     Urinalysis:      Lab Results   Component Value Date    NITRU Negative 09/19/2019    WBCUA 0-2 09/19/2019    BACTERIA 1+ 09/19/2019    RBCUA None seen 09/19/2019    BLOODU TRACE-INTACT 09/19/2019    SPECGRAV 1.015 09/19/2019    GLUCOSEU Negative 09/19/2019       Radiology:  XR SHOULDER RIGHT (MIN 2 VIEWS)    (Results Pending)           Assessment/Plan:    Active Hospital Problems    Diagnosis    Closed fracture of proximal end of humerus [S42.209A]    ETOH abuse [F10.10]    Humeral head fracture, right, closed, initial encounter [S42.291A]     Acute comminuted fx of proximal humerus - initial encounter  Ortho consult  Antiemetic, pain management  Medically stable for emergent surgery.     ETOH abuse  [facility] protocol  Monitor       Tobacco abuse  Nicotine patch     DVT Prophylaxis: Lovenox  Diet: Diet NPO, After Midnight Exceptions are: Sips with Meds  Code Status: Full Code    PT/OT Eval Status: Not ordered    Dispo -2 to 4 days    Dru Arshad MD

## 2020-06-29 NOTE — CARE COORDINATION
Pt in OR at this time. CM team will follow post op for needs/therapy recommendations.      Camryn Hastings RN

## 2020-06-29 NOTE — PROGRESS NOTES
Patient arrived to room via transport/bed @ 1750. Patient alert and oriented stable on 4LNC. Vitals stable at time of arrival. Patient has no complaints of pain at this time. Block and dressing in place. Neuro and peripheralvascular checks WNL. Patient wife at bedside and all questions answered at this time. CIWA assessed and patient currently at a 6 with no medication intervention.  Will continue to monitor

## 2020-06-29 NOTE — PROGRESS NOTES
Report to Huber Jenkins.    4 Eyes Skin Assessment     The patient is being assess for   Post-Op Surgical    I agree that 2 RN's have performed a thorough Head to Toe Skin Assessment on the patient. ALL assessment sites listed below have been assessed. Areas assessed by both nurses:   []   Head, Face, and Ears   [x]   Shoulders, Back, and Chest, Abdomen  [x]   Arms, Elbows, and Hands   [x]   Coccyx, Sacrum, and Ischium  []   Legs, Feet, and Heels        No skin issues. **SHARE this note so that the co-signing nurse is able to place an eSignature**    Co-signer eSignature: Electronically signed by Kayla Phelps RN on 6/29/20 at 5:23 PM EDT    Does the Patient have Skin Breakdown?   No          To Prevention initiated:  No   Wound Care Orders initiated:  No      [facility] nurse consulted for Pressure Injury (Stage 3,4, Unstageable, DTI, NWPT, Complex wounds)and New or Established Ostomies:  No      Primary Nurse eSignature: Electronically signed by Paty Torres RN on 6/30/20 at 7:41 AM EDT

## 2020-06-29 NOTE — PROGRESS NOTES
Nutrition Assessment    Type and Reason for Visit: Initial, Positive Nutrition Screen(weight loss, decreased appetite)    Nutrition Recommendations:   1. Advance diet as medically feasible to general   2. Add ONS when diet advances   3. Obtain new weight  4. Monitor nutrition adequacy, pertinent labs, bowel habits, wt changes, and clinical progress    Nutrition Assessment: Pt admitted for fracture of proximal humerous, plan for OR today. Pt nutritionally compromised AEB pt report of decreased appetite and weight loss. Family reports decreased appetite and PO intakes r/t increased alcohol consumption x3 months PTA (alcohol withdrawal), thiamin and folic acid supplements ordered. Pt currently NPO for procedure. Will continue to monitor. Malnutrition Assessment:  · Malnutrition Status:  At risk for malnutrition    Nutrition Risk Level: High    Nutrient Needs:  · Estimated Daily Total Kcal: 3366-1502 kcal  · Estimated Daily Protein (g):  g  · Estimated Daily Total Fluid (ml/day): 1 ml/kcal    Nutrition Diagnosis:   · Problem: Inadequate oral intake  · Etiology: related to Insufficient energy/nutrient consumption     Signs and symptoms:  as evidenced by Diet history of poor intake, Patient report of(alcohol dependence)    Objective Information:  · Nutrition-Focused Physical Findings: no BM recorded  · Wound Type: (surgical incision)  · Current Nutrition Therapies:  · Oral Diet Orders: NPO   · Oral Diet intake: NPO  · Oral Nutrition Supplement (ONS) Orders: None  · Anthropometric Measures:  · Ht: 6' 3\" (190.5 cm)   · Current Body Wt: 160 lb (72.6 kg)(stated weight)  · Usual Body Wt: 160 lb (72.6 kg)(per pt)  · % Weight Change:  ,  wt in EMR of 148 lb in Sept. 2019, unclear weight loss  · Ideal Body Wt: 196 lb (88.9 kg),   · BMI Classification: BMI 18.5 - 24.9 Normal Weight    Nutrition Interventions:   Start oral diet, Start ONS(when medically feasible)  Continued Inpatient Monitoring    Nutrition Evaluation:   · Evaluation: Goals set   · Goals: Pt will consume greater than 50% of meals and ONS this admission    · Monitoring: Nutrition Progression, Meal Intake, Supplement Intake, Weight, Pertinent Labs      Electronically signed by Nadir Pham MS, RD, LD on 6/29/20 at 1:01 PM EDT    Contact Number: Office: [phone]

## 2020-06-29 NOTE — CONSULTS
Inpatient Consultation    Jimmy Wan (1970)  6/29/2020 Date of consult    Reason for Consult: Right proximal humerus fracture dislocation  Requesting Physician: DANY Dale CNP    CHIEF COMPLAINT:  As above    History Obtained From:  patient, electronic medical record    HISTORY OF PRESENT ILLNESS:                The patient is a 48 y.o. male who presents with above chief complaint. Patient sustained a fall while walking in the clinic on Friday, June 26, landed directly on outstretched right arm and noticed immediate pain in the shoulder. Presented to the emergency room for evaluation on Saturday, June 27 for x-rays confirmed a fracture dislocation of the right proximal humerus. Patient was instructed to stay for surgical intervention, patient did leave AMA from the emergency room and was bribed by family members with vodka to return. On return to emergency room his alcohol level was 305. Patient is right-hand dominant. Does have a history of alcohol abuse and is tremulous at baseline. Comfortable in bed and receiving block at this time. Past Medical History:    History reviewed. No pertinent past medical history.     Past Surgical History:        Procedure Laterality Date    NERVE SURGERY Left     PERONEAL NERVE       Current Medications:   Current Facility-Administered Medications: nicotine (NICODERM CQ) 21 MG/24HR 1 patch, 1 patch, Transdermal, Daily  sodium chloride flush 0.9 % injection 10 mL, 10 mL, Intravenous, 2 times per day  sodium chloride flush 0.9 % injection 10 mL, 10 mL, Intravenous, PRN  acetaminophen (TYLENOL) tablet 650 mg, 650 mg, Oral, Q6H PRN **OR** acetaminophen (TYLENOL) suppository 650 mg, 650 mg, Rectal, Q6H PRN  polyethylene glycol (GLYCOLAX) packet 17 g, 17 g, Oral, Daily PRN  promethazine (PHENERGAN) tablet 12.5 mg, 12.5 mg, Oral, Q6H PRN **OR** ondansetron (ZOFRAN) injection 4 mg, 4 mg, Intravenous, Q6H PRN  ibuprofen (ADVIL;MOTRIN) tablet 600 mg, 600 mg, Oral, Q6H PRN  oxyCODONE-acetaminophen (PERCOCET) 5-325 MG per tablet 1 tablet, 1 tablet, Oral, Q4H PRN **OR** oxyCODONE-acetaminophen (PERCOCET) 5-325 MG per tablet 2 tablet, 2 tablet, Oral, Q4H PRN  HYDROmorphone (DILAUDID) injection 1 mg, 1 mg, Intravenous, Q3H PRN  sodium chloride 0.9 % 1,959 mL with folic acid 1 mg, adult multi-vitamin with vitamin k 10 mL, thiamine 100 mg, , Intravenous, Daily  ceFAZolin (ANCEF) 2 g in dextrose 5 % 100 mL IVPB, 2 g, Intravenous, On Call to OR  sodium chloride flush 0.9 % injection 10 mL, 10 mL, Intravenous, 2 times per day  sodium chloride flush 0.9 % injection 10 mL, 10 mL, Intravenous, PRN  LORazepam (ATIVAN) tablet 1 mg, 1 mg, Oral, Q1H PRN **OR** LORazepam (ATIVAN) injection 1 mg, 1 mg, Intravenous, Q1H PRN **OR** LORazepam (ATIVAN) tablet 2 mg, 2 mg, Oral, Q1H PRN **OR** LORazepam (ATIVAN) injection 2 mg, 2 mg, Intravenous, Q1H PRN **OR** LORazepam (ATIVAN) tablet 3 mg, 3 mg, Oral, Q1H PRN **OR** LORazepam (ATIVAN) injection 3 mg, 3 mg, Intravenous, Q1H PRN **OR** LORazepam (ATIVAN) tablet 4 mg, 4 mg, Oral, Q1H PRN **OR** LORazepam (ATIVAN) injection 4 mg, 4 mg, Intravenous, Q1H PRN    Allergies:  Patient has no known allergies. Social History:   TOBACCO:   reports that he has been smoking. He has been smoking about 1.00 pack per day. He has never used smokeless tobacco.  ETOH:   reports current alcohol use of about 14.0 standard drinks of alcohol per week. DRUGS:   reports current drug use. Drug: Marijuana. Family History:   History reviewed. No pertinent family history.     REVIEW OF SYSTEMS:    CONSTITUTIONAL:  positive for  tremors  RESPIRATORY:  negative  CARDIOVASCULAR:  negative  MUSCULOSKELETAL:  positive for  joint swelling and decreased range of motion    PHYSICAL EXAM:      General: alert, appears stated age and cooperative   Skin: Skin intact, No Erythema and + Edema   Extremity: Distal NVI   DVT Exam: No evidence of DVT seen on physical exam.  Negative Karnie's sign. No significant calf/ankle edema. RUE: + radial and ulnar pulse palpable, + sensation to light touch C5-T1, arm and forearm compartments soft and compressible, motor function intact to PIN, AIN, M, U, R nerves. Good cap refill <2 sec  Tenderness to palpation and obvious deformity of the proximal humerus  Significant edema and ecchymosis to RUE consistent with injury    DATA:        CBC with Differential:    Lab Results   Component Value Date    WBC 12.8 06/27/2020    RBC 4.17 06/27/2020    HGB 14.3 06/27/2020    HCT 41.4 06/27/2020     06/27/2020    MCV 99.1 06/27/2020    MCH 34.3 06/27/2020    MCHC 34.6 06/27/2020    RDW 13.7 06/27/2020    LYMPHOPCT 3.2 06/27/2020    MONOPCT 8.6 06/27/2020    BASOPCT 0.3 06/27/2020    MONOSABS 1.1 06/27/2020    LYMPHSABS 0.4 06/27/2020    EOSABS 0.0 06/27/2020    BASOSABS 0.0 06/27/2020     CMP:    Lab Results   Component Value Date     06/27/2020    K 3.8 06/27/2020    CL 94 06/27/2020    CO2 18 06/27/2020    BUN 13 06/27/2020    CREATININE 0.6 06/27/2020    GFRAA >60 06/27/2020    AGRATIO 1.6 06/27/2020    LABGLOM >60 06/27/2020    GLUCOSE 165 06/27/2020    PROT 7.3 06/27/2020    CALCIUM 9.2 06/27/2020    BILITOT 0.4 06/27/2020    ALKPHOS 79 06/27/2020     06/27/2020    ALT 61 06/27/2020     BMP:    Lab Results   Component Value Date     06/27/2020    K 3.8 06/27/2020    CL 94 06/27/2020    CO2 18 06/27/2020    BUN 13 06/27/2020    CREATININE 0.6 06/27/2020    CALCIUM 9.2 06/27/2020    GFRAA >60 06/27/2020    LABGLOM >60 06/27/2020    GLUCOSE 165 06/27/2020         Radiology:     @  No orders to display         IMPRESSION/RECOMMENDATIONS:    Assessment: Right proximal humerus fracture dislocation    Plan:  1) Imaging findings reviewed with patient and family. Conservative and surgical treatment options discussed. The severity of his fracture and ongoing dislocation surgical intervention is indicated.   After discussion today with the family and patient they elected to proceed with surgical intervention. The surgical procedure was discussed in detail. The risks and possible complications of the surgery in general, as well as those directly related to this procedure were reviewed today. General risks include but are not limited to persistent pain, infection, excessive blood loss, neurovascular injury, chronic swelling or edema, and the potential need for additional treatment or surgical intervention in the future. The patient understands that, again, the risks and possible complications are not limited to those specifically stated above. Due to the patients history of alcohol abuse and [facility] orders that he is at increased risk for complication. The patient accepted the above risks, possible complications and elects to proceed. All questions were answered appropriately, and they understand that they can contact me at any time to further discuss any questions or concerns. I recommend reverse shoulder arthroplasty due to the unstable nature of the fracture, dislocation and their bone qualtiy. 2) NPO, consent obtained, abx on call to OR  3) patient medically optimized, patient is going through alcohol withdrawals and this is being medically managed currently. We will continue to watch him closely postoperatively and help manage withdrawls as best possible. 4) will continue to follow post operatively, will need SW for D/C planning        Thank you for the opportunity to consult on this patient.      Sudheer Smith

## 2020-06-29 NOTE — ANESTHESIA PRE PROCEDURE
(TYLENOL) suppository 650 mg  650 mg Rectal Q6H PRN Elias Farnsworth PA-C        polyethylene glycol (GLYCOLAX) packet 17 g  17 g Oral Daily PRN Elias Farnsworth PA-C        promethazine (PHENERGAN) tablet 12.5 mg  12.5 mg Oral Q6H PRN Elias Farnsworth PA-C        Or    ondansetron [facility]) injection 4 mg  4 mg Intravenous Q6H PRN Elias Farnsworth PA-C        ibuprofen (ADVIL;MOTRIN) tablet 600 mg  600 mg Oral Q6H PRN Mauro Main MD        oxyCODONE-acetaminophen (PERCOCET) 5-325 MG per tablet 1 tablet  1 tablet Oral Q4H PRN Mauro Main MD   1 tablet at 06/28/20 2159    Or    oxyCODONE-acetaminophen (PERCOCET) 5-325 MG per tablet 2 tablet  2 tablet Oral Q4H PRN Mauro Main MD   2 tablet at 06/29/20 0422    HYDROmorphone (DILAUDID) injection 1 mg  1 mg Intravenous Q3H PRN Mauro Main MD        sodium chloride 0.9 % 6,171 mL with folic acid 1 mg, adult multi-vitamin with vitamin k 10 mL, thiamine 100 mg   Intravenous Daily Itzel Beckham  mL/hr at 06/29/20 1038      ceFAZolin (ANCEF) 2 g in dextrose 5 % 100 mL IVPB  2 g Intravenous On Call to [address] [place] [address]        sodium chloride flush 0.9 % injection 10 mL  10 mL Intravenous 2 times per day Harvie Lesches, MD   10 mL at 06/29/20 0821    sodium chloride flush 0.9 % injection 10 mL  10 mL Intravenous PRN Harvie Lesches, MD        LORazepam (ATIVAN) tablet 1 mg  1 mg Oral Q1H PRN Harvie Lesches, MD   1 mg at 06/28/20 1142    Or    LORazepam (ATIVAN) injection 1 mg  1 mg Intravenous Q1H PRN Harvie Lesches, MD        Or    LORazepam (ATIVAN) tablet 2 mg  2 mg Oral Q1H PRN Harvie Lesches, MD   2 mg at 06/29/20 8900    Or    LORazepam (ATIVAN) injection 2 mg  2 mg Intravenous Q1H PRN Harvie Lesches, MD   2 mg at 06/29/20 1004    Or    LORazepam (ATIVAN) tablet 3 mg  3 mg Oral Q1H PRN Harvie Lesches, MD   3 mg at 06/29/20 0422    Or    LORazepam (ATIVAN) injection 3 mg  3 mg Intravenous Q1H PRN Harvie Lesches, MD        Or    LORazepam (ATIVAN) tablet 4 mg  4 mg Oral Q1H PRN Bailey Suazo MD        Or    LORazepam (ATIVAN) injection 4 mg  4 mg Intravenous Q1H PRN Bailey Suazo MD           Allergies:  No Known Allergies    Problem List:    Patient Active Problem List   Diagnosis Code    Tobacco use Z72.0    Alcohol withdrawal ([facility] [address] F10.239    Moderate alcohol withdrawal without perceptual disturbances with delirium (HCC) F10.231    Closed fracture of proximal end of humerus S42.209A    ETOH abuse F10.10    Humeral head fracture, right, closed, initial encounter K93.198K       Past Medical History:  History reviewed. No pertinent past medical history. Past Surgical History:        Procedure Laterality Date    NERVE SURGERY Left     PERONEAL NERVE       Social History:    Social History     Tobacco Use    Smoking status: Current Every Day Smoker     Packs/day: 1.00    Smokeless tobacco: Never Used   Substance Use Topics    Alcohol use:  Yes     Alcohol/week: 14.0 standard drinks     Types: 14 Cans of beer per week     Comment: daily vodka drinker                                 Ready to quit: Not Answered  Counseling given: Not Answered      Vital Signs (Current):   Vitals:    06/29/20 0538 06/29/20 0650 06/29/20 0800 06/29/20 1001   BP: 132/80 129/89 117/85 (!) 141/78   Pulse: 144 108 118 117   Resp:   16    Temp:   98.6 °F (37 °C)    TempSrc:   Oral    SpO2:   91%    Weight:       Height:                                                  BP Readings from Last 3 Encounters:   06/29/20 (!) 141/78   06/27/20 (!) 157/100   02/07/20 (!) 144/88       NPO Status: Time of last liquid consumption: 2359                        Time of last solid consumption: 2359                        Date of last liquid consumption: 06/28/20                        Date of last solid food consumption: 06/28/20    BMI:   Wt Readings from Last 3 Encounters:   06/28/20 160 lb (72.6 kg)   06/27/20 160 lb (72.6 kg)   02/07/20 148 lb 13 oz (67.5 kg)     Body

## 2020-06-29 NOTE — OP NOTE
Reverse Shoulder Arthroplasty for Proximal Humerus Fracture    Jeremy Mtz (1970)  Date of Surgery- 6/29/2020    Preoperative Diagnosis-  Four part proximal humerus fracture dislocation right shoulder     Postoperative Diagnosis- Four part proximal humerus fracture dislocation right shoulder       Procedure-  1. Reverse total right shoulder arthroplasty ([id number])           2. Biceps Tenodesis ([id number])           3. Intraoperative use of C-arm fluoroscopy (73780-89)    Surgeon-  Hannah Qureshi DO    Primary Assistants- * No surgical staff found *    Anesthesia:  General + Interscalene block    Estimated blood loss:  200ml     Drains- Medium Hemovac    Implants- Cinthia reunion    Diaphysis-13 mm humeral fracture stem    Metaglene- standard; 32 mm screw x2, 28 mm screw, 16 mm screw x2    Glenosphere-  36 standard    Humeral polyethylene cup-36 mm x 6 mm    Cement- 1 batch      Surgical Indications  The patient sustained the aforementioned injury. Preoperative physical exam and imaging studies confirmed the diagnosis. The patient chose to proceed with shoulder arthroplasty and biceps tenodesis. Risks, benefits, expected outcomes and potential complications were discussed. At no time were any guarantees implied or stated. The patient electively signed the consent form. Intra-Operative Findings  Comminuted, fracture dislocation of the proximal humerus  Articular segment devoid of soft tissue attachments  . Patient Positioning and Surgical Prep  The patient was seen in the holding area and the appropriate extremity marked with an indelible pen. The anesthesia department administered an interscalene block. The patient was taken to the operative suite, identified and transferred to the OR table with the attached T-Max shoulder table. General anesthesia was administered.   The patient was placed in the semi beach chair position with the head of the bed elevated to 30 metaglene taking care to fully lock the glenosphere onto the tray. Humeral Preparation  The diaphysis was exposed and manually reamed. A trial component was placed with the appropriate height estimated from the pectoralis major insertion and the reduced tuberosities. The height was maintained with the trial component. Ideal version of approximately 20 degrees was confirmed visually through the stem  and with the aid of the humeral condyles. .    Final Component Placement  The shoulder was dislocated and the trial components removed. 2 drill holes were placed in the humeral diaphysis. Number 2 Orthocord sutures were placed through the holes. The proximal humerus was prepared  copiously irrigating and then drying the medullary canal. The humeral component was cemented into place. The definitive humeral polyethylene cup was impacted into place and the shoulder reduced. Tension in the conjoined tendon was used to estimated appropriate humeral height. The shoulder was taken through a range of motion and noted to be stable. Tuberosity Fixation  The tuberosities were repaired using a combination of horizontal and vertical sutures. Horizontal sutures placed through and around the stem were placed in both the rotator cuff tendon and the tuberosities. Vertical simple and half hitch sutures previously placed through the diaphysis were placed through both tendon and bone. Bone graft from the humeral head had been placed between the tuberosities. The shoulder was taken through a range of motion and noted to be stable. Multiplanar fluoroscopic images were obtained to confirm that the component was well reduced and the tuberosities were properly positioned. Closure and Disposition  The axillary nerve was checked and noted to be intact. Medium Hemovac was placed. The wound irrigated. The deltopectoral interval was closed with 0 Vicryl suture.   The subcutaneous tissue closed and in layers and the

## 2020-06-30 LAB
ALBUMIN SERPL-MCNC: 3.1 G/DL (ref 3.4–5)
ANION GAP SERPL CALCULATED.3IONS-SCNC: 10 MMOL/L (ref 3–16)
BUN BLDV-MCNC: 9 MG/DL (ref 7–20)
CALCIUM SERPL-MCNC: 7.7 MG/DL (ref 8.3–10.6)
CHLORIDE BLD-SCNC: 95 MMOL/L (ref 99–110)
CO2: 23 MMOL/L (ref 21–32)
CREAT SERPL-MCNC: 0.6 MG/DL (ref 0.9–1.3)
GFR AFRICAN AMERICAN: >60
GFR NON-AFRICAN AMERICAN: >60
GLUCOSE BLD-MCNC: 121 MG/DL (ref 70–99)
HCT VFR BLD CALC: 22.8 % (ref 40.5–52.5)
HEMOGLOBIN: 8 G/DL (ref 13.5–17.5)
MAGNESIUM: 1.3 MG/DL (ref 1.8–2.4)
MCH RBC QN AUTO: 35.2 PG (ref 26–34)
MCHC RBC AUTO-ENTMCNC: 35.2 G/DL (ref 31–36)
MCV RBC AUTO: 99.8 FL (ref 80–100)
PDW BLD-RTO: 13.3 % (ref 12.4–15.4)
PHOSPHORUS: 2.1 MG/DL (ref 2.5–4.9)
PLATELET # BLD: 72 K/UL (ref 135–450)
PMV BLD AUTO: 9.2 FL (ref 5–10.5)
POTASSIUM SERPL-SCNC: 3.1 MMOL/L (ref 3.5–5.1)
RBC # BLD: 2.29 M/UL (ref 4.2–5.9)
SODIUM BLD-SCNC: 128 MMOL/L (ref 136–145)
WBC # BLD: 9 K/UL (ref 4–11)

## 2020-06-30 PROCEDURE — 85027 COMPLETE CBC AUTOMATED: CPT

## 2020-06-30 PROCEDURE — 83735 ASSAY OF MAGNESIUM: CPT

## 2020-06-30 PROCEDURE — 6370000000 HC RX 637 (ALT 250 FOR IP): Performed by: ORTHOPAEDIC SURGERY

## 2020-06-30 PROCEDURE — 96365 THER/PROPH/DIAG IV INF INIT: CPT

## 2020-06-30 PROCEDURE — 6360000002 HC RX W HCPCS: Performed by: ORTHOPAEDIC SURGERY

## 2020-06-30 PROCEDURE — 6370000000 HC RX 637 (ALT 250 FOR IP): Performed by: INTERNAL MEDICINE

## 2020-06-30 PROCEDURE — 6360000002 HC RX W HCPCS: Performed by: INTERNAL MEDICINE

## 2020-06-30 PROCEDURE — G0378 HOSPITAL OBSERVATION PER HR: HCPCS

## 2020-06-30 PROCEDURE — 96376 TX/PRO/DX INJ SAME DRUG ADON: CPT

## 2020-06-30 PROCEDURE — 80069 RENAL FUNCTION PANEL: CPT

## 2020-06-30 PROCEDURE — 2580000003 HC RX 258: Performed by: ORTHOPAEDIC SURGERY

## 2020-06-30 PROCEDURE — 96366 THER/PROPH/DIAG IV INF ADDON: CPT

## 2020-06-30 PROCEDURE — 96367 TX/PROPH/DG ADDL SEQ IV INF: CPT

## 2020-06-30 PROCEDURE — 2500000003 HC RX 250 WO HCPCS: Performed by: ORTHOPAEDIC SURGERY

## 2020-06-30 PROCEDURE — 2700000000 HC OXYGEN THERAPY PER DAY

## 2020-06-30 PROCEDURE — 97530 THERAPEUTIC ACTIVITIES: CPT

## 2020-06-30 PROCEDURE — 94761 N-INVAS EAR/PLS OXIMETRY MLT: CPT

## 2020-06-30 PROCEDURE — 96375 TX/PRO/DX INJ NEW DRUG ADDON: CPT

## 2020-06-30 PROCEDURE — 36415 COLL VENOUS BLD VENIPUNCTURE: CPT

## 2020-06-30 PROCEDURE — 97162 PT EVAL MOD COMPLEX 30 MIN: CPT

## 2020-06-30 PROCEDURE — 97110 THERAPEUTIC EXERCISES: CPT

## 2020-06-30 RX ORDER — MAGNESIUM SULFATE IN WATER 40 MG/ML
2 INJECTION, SOLUTION INTRAVENOUS ONCE
Status: COMPLETED | OUTPATIENT
Start: 2020-06-30 | End: 2020-06-30

## 2020-06-30 RX ORDER — POTASSIUM CHLORIDE 20 MEQ/1
20 TABLET, EXTENDED RELEASE ORAL 2 TIMES DAILY
Status: COMPLETED | OUTPATIENT
Start: 2020-06-30 | End: 2020-07-01

## 2020-06-30 RX ADMIN — ACETAMINOPHEN 650 MG: 325 TABLET, FILM COATED ORAL at 11:27

## 2020-06-30 RX ADMIN — ACETAMINOPHEN 650 MG: 325 TABLET, FILM COATED ORAL at 01:33

## 2020-06-30 RX ADMIN — ACETAMINOPHEN 650 MG: 325 TABLET, FILM COATED ORAL at 05:16

## 2020-06-30 RX ADMIN — LORAZEPAM 2 MG: 1 TABLET ORAL at 09:04

## 2020-06-30 RX ADMIN — LORAZEPAM 3 MG: 1 TABLET ORAL at 20:36

## 2020-06-30 RX ADMIN — SENNOSIDES AND DOCUSATE SODIUM 1 TABLET: 8.6; 5 TABLET ORAL at 22:10

## 2020-06-30 RX ADMIN — LORAZEPAM 4 MG: 1 TABLET ORAL at 22:10

## 2020-06-30 RX ADMIN — POTASSIUM CHLORIDE 20 MEQ: 20 TABLET, EXTENDED RELEASE ORAL at 12:26

## 2020-06-30 RX ADMIN — MAGNESIUM SULFATE HEPTAHYDRATE 2 G: 40 INJECTION, SOLUTION INTRAVENOUS at 12:26

## 2020-06-30 RX ADMIN — CEFAZOLIN SODIUM 2 G: 1 INJECTION, POWDER, FOR SOLUTION INTRAMUSCULAR; INTRAVENOUS at 05:16

## 2020-06-30 RX ADMIN — ACETAMINOPHEN 650 MG: 325 TABLET, FILM COATED ORAL at 18:08

## 2020-06-30 RX ADMIN — LORAZEPAM 2 MG: 1 TABLET ORAL at 16:51

## 2020-06-30 RX ADMIN — LORAZEPAM 2 MG: 1 TABLET ORAL at 03:57

## 2020-06-30 RX ADMIN — LORAZEPAM 2 MG: 1 TABLET ORAL at 19:22

## 2020-06-30 RX ADMIN — FOLIC ACID: 5 INJECTION, SOLUTION INTRAMUSCULAR; INTRAVENOUS; SUBCUTANEOUS at 09:04

## 2020-06-30 RX ADMIN — LORAZEPAM 2 MG: 1 TABLET ORAL at 01:32

## 2020-06-30 RX ADMIN — LORAZEPAM 3 MG: 2 INJECTION, SOLUTION INTRAMUSCULAR; INTRAVENOUS at 23:10

## 2020-06-30 RX ADMIN — SENNOSIDES AND DOCUSATE SODIUM 1 TABLET: 8.6; 5 TABLET ORAL at 08:59

## 2020-06-30 RX ADMIN — Medication 10 ML: at 09:00

## 2020-06-30 RX ADMIN — POTASSIUM CHLORIDE 20 MEQ: 20 TABLET, EXTENDED RELEASE ORAL at 22:10

## 2020-06-30 ASSESSMENT — PAIN SCALES - GENERAL
PAINLEVEL_OUTOF10: 2
PAINLEVEL_OUTOF10: 1
PAINLEVEL_OUTOF10: 2
PAINLEVEL_OUTOF10: 1
PAINLEVEL_OUTOF10: 1

## 2020-06-30 ASSESSMENT — PAIN SCALES - WONG BAKER: WONGBAKER_NUMERICALRESPONSE: 2

## 2020-06-30 ASSESSMENT — PAIN DESCRIPTION - ONSET
ONSET: ON-GOING

## 2020-06-30 ASSESSMENT — PAIN DESCRIPTION - ORIENTATION
ORIENTATION: RIGHT

## 2020-06-30 ASSESSMENT — PAIN DESCRIPTION - PAIN TYPE
TYPE: SURGICAL PAIN

## 2020-06-30 ASSESSMENT — PAIN DESCRIPTION - DESCRIPTORS
DESCRIPTORS: DULL
DESCRIPTORS: DULL;CONSTANT

## 2020-06-30 ASSESSMENT — PAIN DESCRIPTION - FREQUENCY
FREQUENCY: INTERMITTENT

## 2020-06-30 ASSESSMENT — PAIN - FUNCTIONAL ASSESSMENT
PAIN_FUNCTIONAL_ASSESSMENT: PREVENTS OR INTERFERES SOME ACTIVE ACTIVITIES AND ADLS

## 2020-06-30 ASSESSMENT — PAIN DESCRIPTION - PROGRESSION
CLINICAL_PROGRESSION: GRADUALLY WORSENING

## 2020-06-30 ASSESSMENT — PAIN DESCRIPTION - LOCATION
LOCATION: SHOULDER

## 2020-06-30 NOTE — PROGRESS NOTES
Hospitalist Progress Note      PCP: No primary care provider on file. Date of Admission: 6/27/2020    Chief Complaint: Fall and right shoulder pain    Hospital Course: See H&P    Subjective:   Patient is up in bed, comfortable, not in distress. Complaining of right shoulder pain. No new event overnight noted. Medications:  Reviewed    Infusion Medications    sodium chloride       Scheduled Medications    acetaminophen  650 mg Oral Q6H    sennosides-docusate sodium  1 tablet Oral BID    nicotine  1 patch Transdermal Daily    sodium chloride flush  10 mL Intravenous 2 times per day     PRN Meds: magnesium hydroxide, sodium chloride flush, acetaminophen **OR** acetaminophen, polyethylene glycol, promethazine **OR** ondansetron, ibuprofen, oxyCODONE-acetaminophen **OR** oxyCODONE-acetaminophen, HYDROmorphone, LORazepam **OR** LORazepam **OR** LORazepam **OR** LORazepam **OR** LORazepam **OR** LORazepam **OR** LORazepam **OR** LORazepam      Intake/Output Summary (Last 24 hours) at 6/30/2020 0905  Last data filed at 6/30/2020 0354  Gross per 24 hour   Intake 2800 ml   Output 1650 ml   Net 1150 ml       Physical Exam Performed:    /72   Pulse 124   Temp 98.3 °F (36.8 °C) (Axillary)   Resp 22   Ht 6' 3\" (1.905 m)   Wt 160 lb (72.6 kg)   SpO2 93%   BMI 20.00 kg/m²     General appearance: No apparent distress, appears stated age and cooperative. HEENT: Pupils equal, round, and reactive to light. Conjunctivae/corneas clear. Neck: Supple, with full range of motion. No jugular venous distention. Trachea midline. Respiratory:  Normal respiratory effort. Clear to auscultation, bilaterally without Rales/Wheezes/Rhonchi. Cardiovascular: Regular rate and rhythm with normal S1/S2 without murmurs, rubs or gallops. Abdomen: Soft, non-tender, non-distended with normal bowel sounds. Musculoskeletal: No clubbing, cyanosis or edema bilaterally. Right shoulder pain.   Skin: Skin color, texture, turgor normal.  No rashes or lesions. Neurologic:  Neurovascularly intact without any focal sensory/motor deficits. Cranial nerves: II-XII intact, grossly non-focal.  Psychiatric: Alert and oriented, thought content appropriate, normal insight  Capillary Refill: Brisk,< 3 seconds   Peripheral Pulses: +2 palpable, equal bilaterally       Labs:   Recent Labs     06/27/20  2355 06/29/20  0909 06/29/20  1718   WBC 12.8* 10.0 9.4   HGB 14.3 11.9* 9.9*   HCT 41.4 34.2* 28.9*   * 69* 61*     Recent Labs     06/27/20  2355 06/29/20  0909   * 130*   K 3.8 3.4*   CL 94* 94*   CO2 18* 25   BUN 13 10   CREATININE 0.6* 0.6*   CALCIUM 9.2 8.9     Recent Labs     06/27/20  2355   *   ALT 61*   BILITOT 0.4   ALKPHOS 79     Recent Labs     06/27/20 2355 06/29/20  0909   INR 0.94 0.97     No results for input(s): Pool [place] in the last 72 hours. Urinalysis:      Lab Results   Component Value Date    NITRU Negative 09/19/2019    WBCUA 0-2 09/19/2019    BACTERIA 1+ 09/19/2019    RBCUA None seen 09/19/2019    BLOODU TRACE-INTACT 09/19/2019    SPECGRAV 1.015 09/19/2019    GLUCOSEU Negative 09/19/2019       Radiology:  XR SHOULDER RIGHT (MIN 2 VIEWS)   Final Result   Intraprocedural fluoroscopic spot images as above. See separate procedure   report for more information.          FLUORO FOR SURGICAL PROCEDURES    (Results Pending)           Assessment/Plan:    Active Hospital Problems    Diagnosis    Closed fracture of proximal end of humerus [S42.209A]    ETOH abuse [F10.10]    Humeral head fracture, right, closed, initial encounter [S42.291A]     Acute comminuted fx of proximal humerus - initial encounter  Ortho consult  Antiemetic, pain management  Continue pain management, wound care and therapy as per surgery.     ETOH abuse  [facility] protocol  Continue supportive care, monitor.     Tobacco abuse  Nicotine patch     DVT Prophylaxis: Lovenox  Diet: DIET GENERAL;  Code Status: Full Code    PT/OT Eval Status: ordered    Dispo - in 1 to 2 days    Jillian Olson MD

## 2020-06-30 NOTE — PROGRESS NOTES
primary encounter diagnosis was Anterior dislocation of right humerus, initial encounter. A diagnosis of Alcohol withdrawal syndrome with complication [facility]) was also pertinent to this visit. has no past medical history on file. has a past surgical history that includes Nerve Surgery (Left). Restrictions  Restrictions/Precautions: ROM Restrictions, Up as Tolerated, Weight Bearing, Fall Risk  Right Upper Extremity Weight Bearing: Non Weight Bearing  Right Upper Extremity Brace/Splint: Sling  Position Activity Restriction  Other position/activity restrictions: R shoulder passive flexion no greater than 90*, abd no greater than 70*, no resisted IR. CIWA. Subjective   Chart Reviewed: Yes  Response To Previous Treatment: Patient with no complaints from previous session. Family / Caregiver Present: Yes(wife)  Referring Practitioner: BHARGAVI Mcconnell  Subjective: pt agrees to therapy. Pt and wife voice understanding of up with staff assist only, shoulder precautions, lower arm A/AAROM ex, donning/doffing use of sling. General Comment  Comments: RN cleared pt for therapy, pt resting in bed on approach. Upon entry drain resevoir on floor and disconnected from tubing. RN informed. RN arrived to pull drain and put on fresh bandage.   Pain Screening  Patient Currently in Pain: Yes  Pain Assessment  Pain Assessment: Faces  Pain Level: 2  Curiel-Baker Pain Rating: Hurts a little bit  Pain Type: Surgical pain  Pain Location: Shoulder  Pain Orientation: Right  Pain Descriptors: Dull  Pain Frequency: Intermittent  Pain Onset: On-going  Clinical Progression: Gradually worsening  Functional Pain Assessment: Prevents or interferes some active activities and ADLs  Non-Pharmaceutical Pain Intervention(s): Emotional support  Response to Pain Intervention: Patient Satisfied  Pre Treatment Pain Screening  Intervention List: Patient able to continue with treatment    Orientation  Overall Orientation Status: Within Normal Limits  Objective Bed mobility  Supine to Sit: Contact guard assistance  Sit to Supine: Contact guard assistance  Scooting: Contact guard assistance  Transfers  Sit to Stand: Minimal Assistance  Stand to sit: Minimal Assistance  Ambulation  Ambulation?: No(Pt able to achieve full standing posture for 1 min with assist of 2, unable to amb due to strong posterior lean)     Balance  Sitting - Static: Good  Standing - Static: Poor  Exercises  Straight Leg Raise: 10 x B  Gluteal Sets: 10 x B  Ankle Pumps: 15 x B  Upper Extremity: RUE A/AAROM hand, wrist, forearm and elbow 10 x each  Comments: Omitted pendulums and scapular exercises due to shakiness of pt and difficulty following instructions, Bin Keller PA informed of modified ex program and voiced agreement                 AM-PAC Score  AM-PAC Inpatient Mobility Raw Score : 11 (06/30/20 1014)  AM-PAC Inpatient T-Scale Score : 33.86 (06/30/20 1014)  Mobility Inpatient CMS 0-100% Score: 72.57 (06/30/20 1014)  Mobility Inpatient CMS G-Code Modifier : CL (06/30/20 1014)   Goals  Short term goals  Time Frame for Short term goals: 2-3 days 7/3  Short term goal 1: pt to perform bed mob with Supervision  Short term goal 2: pt to perform transfers with supervision  Short term goal 3: pt to amb with cane or hand held assist 50 ft CG  Short term goal 4: pt to participate in ther ex 10-12 reps  Short term goal 5: pt and wife to voice understanding of shoulder precautions, donning/doffing sling, HEP- MET  Patient Goals   Patient goals : \"to know how to use this sling\"- MET    Plan    Times per week: BID 7 days wk  Times per day: Twice a day  Specific instructions for Next Treatment: ex, progress mobility  Current Treatment Recommendations: Strengthening, Gait Training, Balance Training, Functional Mobility Training, Transfer Training, Safety Education & Training  Safety Devices  Type of devices:  All fall risk precautions in place, Left in bed, Bed alarm in place, Call light within reach, Patient at risk for falls, Gait belt, Nurse notified     Therapy Time   Individual Concurrent Group Co-treatment   Time In 1407         Time Out 1435         Minutes 28         Timed Code Treatment Minutes: 30 Minutes     If pt discharges prior to next PT session this note will serve as discharge summary.   Alexandro Main, PT

## 2020-06-30 NOTE — PROGRESS NOTES
Patient continues to go through withdrawal. New onset of hallucinations. Wife at bedside assisting patient. Patient was able to stand at the side of the bed with minimal assist this afternoon. Complaint of pain is minimal and is rating low between 1 or 0 each evaluation. Patient has had all questions answered at this time.  Will continue to monitor

## 2020-06-30 NOTE — PROGRESS NOTES
Physical Therapy    Facility/Department: [facility] C5 - MED SURG/ORTHO  Initial Assessment    NAME: Lauren Garcia  : 1970  MRN: [id number]    Date of Service: 2020    Discharge Recommendations:  Continue to assess pending progress   PT Equipment Recommendations  Equipment Needed: No    Assessment   Body structures, Functions, Activity limitations: Decreased functional mobility ; Decreased ROM; Decreased balance; Increased pain;Decreased safe awareness  Assessment: Pt is 47 yo male on Observation post 4 part humeral fx and surgical Reverse TSA with bicep tenodesis. Pt is NWB RUE and in shoulder sling/immobilizer with ROM restrictions. PLOF: lives with wife, level entry to home, 1st foor master bed/bath. Pt was indep with amb and ADLs. Pt is on CIWA protocol. Pt tolerated exercises well, performed bed mob with min assist. Stood 3 x but does not get fully erect, needs to hold bed rail and have assist form therapist, unable to achieve full balance to amb , moderately severe shaking. Pt will benefit from skilled PT to address current deficits. Will cont to assess for discharge recs pending ability to ambulate  Treatment Diagnosis: decreased ROM RUE, decreased mobility post shoulder surgery for fx  Specific instructions for Next Treatment: ex, progress mobility  Prognosis: Good  PT Education: Goals;Transfer Training;PT Role;Functional Mobility Training;Disease Specific Education;Plan of Care;General Safety;Weight-bearing Education;Home Exercise Program;Precautions;Gait Training  Patient Education: Written handout with pictures provided  Barriers to Learning: pt and wife voice understanding  REQUIRES PT FOLLOW UP: Yes  Activity Tolerance  Activity Tolerance: Patient Tolerated treatment well  Activity Tolerance: Pt very shakey, unable to fully stand and get balance to amb       Patient Diagnosis(es): The primary encounter diagnosis was Anterior dislocation of right humerus, initial encounter.  A diagnosis of Alcohol withdrawal syndrome with complication [facility]) was also pertinent to this visit. has no past medical history on file. has a past surgical history that includes Nerve Surgery (Left). Restrictions  Restrictions/Precautions: ROM Restrictions, Up as Tolerated, Weight Bearing, Fall Risk  Required Braces or Orthoses?: Yes  Right Upper Extremity Weight Bearing: Non Weight Bearing  Right Upper Extremity Brace/Splint: Sling  Other position/activity restrictions: R shoulder passive flexion no greater than 90*, abd no greater than 70*, no resisted IR. CIWA. Vision/Hearing  Vision: Impaired  Vision Exceptions: Wears glasses for reading  Hearing: Within functional limits     Subjective  Chart Reviewed: Yes  Patient assessed for rehabilitation services?: Yes  Family / Caregiver Present: Yes(wife)  Referring Practitioner: BHARGAVI Mcconnell  Referral Date : 06/29/20  Diagnosis: 4 part R humeral fx with dislocation. s/p Reverse TSA with bicep tenodesis. Follows Commands: Within Functional Limits  General Comment  Comments: RN cleared pt for therapy, pt resting in bed on approach  Subjective  Subjective: pt agrees to therapy. Pt and wife voice understanding of up with staff assist only, shoulder precautions, lower arm A/AAROM ex, donning/doffing use of sling.    Pain Screening  Patient Currently in Pain: Yes  Pain Assessment  Pain Assessment: 0-10  Pain Level: 2  Pain Type: Surgical pain  Pain Location: Shoulder  Pain Orientation: Right  Pain Descriptors: Dull;Constant  Pain Frequency: Intermittent  Pain Onset: On-going  Clinical Progression: Gradually worsening  Functional Pain Assessment: Prevents or interferes some active activities and ADLs  Non-Pharmaceutical Pain Intervention(s): Emotional support  Response to Pain Intervention: Patient Satisfied  Pre Treatment Pain Screening  Intervention List: Patient able to continue with treatment    Orientation  Overall Orientation Status: Within Normal Limits  Social/Functional History  Social/Functional History  Lives With: Spouse  Type of Home: House  Home Layout: Two level, Able to Live on Main level with bedroom/bathroom  Home Access: Level entry  Bathroom Shower/Tub: Tub/Shower unit  Bathroom Toilet: Standard  ADL Assistance: Independent  Ambulation Assistance: Independent  Transfer Assistance: Independent    Objective     Observation: Pt very shakey in arms and legs. RLE AROM: WFL  LLE AROM : WFL  Strength RLE: WFL  Strength LLE: WFL   Bed mobility  Sit to Supine: Contact guard assistance  Scooting: Contact guard assistance  Transfers  Sit to Stand: Minimal Assistance  Stand to sit: Minimal Assistance  Comment: Practiced sit to and from stand 3 x from EOB, strong posterior lean, unable to get fully standing and get his balance, had to hold onto bed rail at all times and have assist from therapist at gait belt  Ambulation  Ambulation?: No(pt eloy, unable to achieve full standing balance even with assist)     Balance  Sitting - Static: Good  Standing - Static: Poor  Exercises  Straight Leg Raise: 10 x B  Ankle Pumps: 10 x B  Upper Extremity: RUE A/AAROM hand, wrist, forearm and elbow 10 x each  Comments: Omitted pendulums and scapular exercises due to shakiness of pt and difficulty following instructions, Tony MANN informed of modified ex program and voiced agreement     Plan   Times per week: BID 7 days wk  Times per day: Twice a day  Specific instructions for Next Treatment: ex, progress mobility  Current Treatment Recommendations: Strengthening, Gait Training, Balance Training, Functional Mobility Training, Transfer Training, Safety Education & Training  Safety Devices  Type of devices:  All fall risk precautions in place, Left in bed, Bed alarm in place, Call light within reach, Patient at risk for falls, Gait belt, Nurse notified    AM-PAC Score  AM-PAC Inpatient Mobility Raw Score : 11 (06/30/20 1014)  AM-PAC Inpatient T-Scale Score : 33.86 (06/30/20 1014)  Mobility Inpatient CMS 0-100% Score: 72.57 (06/30/20 1014)  Mobility Inpatient CMS G-Code Modifier : CL (06/30/20 1014)     Goals  Short term goals  Time Frame for Short term goals: 2-3 days 7/3  Short term goal 1: pt to perform bed mob with Supervision  Short term goal 2: pt to perform transfers with supervision  Short term goal 3: pt to amb with cane or hand held assist 50 ft CG  Short term goal 4: pt to participate in ther ex 10-12 reps  Short term goal 5: pt and wife to voice understanding of shoulder precautions, donning/doffing sling, HEP- MET  Patient Goals   Patient goals : \"to know how to use this sling\"- MET       Therapy Time   Individual Concurrent Group Co-treatment   Time In 0920         Time Out 1000         Minutes 40         Timed Code Treatment Minutes: 30 Minutes     If pt discharges prior to next PT session this note will serve as discharge summary.   Jane Wetzel, PT

## 2020-07-01 ENCOUNTER — APPOINTMENT (OUTPATIENT)
Dept: GENERAL RADIOLOGY | Age: 50
DRG: 322 | End: 2020-07-01
Payer: COMMERCIAL

## 2020-07-01 LAB
ALBUMIN SERPL-MCNC: 3.2 G/DL (ref 3.4–5)
ALP BLD-CCNC: 59 U/L (ref 40–129)
ALT SERPL-CCNC: 24 U/L (ref 10–40)
ANION GAP SERPL CALCULATED.3IONS-SCNC: 10 MMOL/L (ref 3–16)
ANION GAP SERPL CALCULATED.3IONS-SCNC: 8 MMOL/L (ref 3–16)
AST SERPL-CCNC: 63 U/L (ref 15–37)
BILIRUB SERPL-MCNC: 0.7 MG/DL (ref 0–1)
BILIRUBIN DIRECT: <0.2 MG/DL (ref 0–0.3)
BILIRUBIN, INDIRECT: ABNORMAL MG/DL (ref 0–1)
BUN BLDV-MCNC: 5 MG/DL (ref 7–20)
BUN BLDV-MCNC: 9 MG/DL (ref 7–20)
CALCIUM SERPL-MCNC: 8.2 MG/DL (ref 8.3–10.6)
CALCIUM SERPL-MCNC: 8.2 MG/DL (ref 8.3–10.6)
CHLORIDE BLD-SCNC: 100 MMOL/L (ref 99–110)
CHLORIDE BLD-SCNC: 97 MMOL/L (ref 99–110)
CO2: 24 MMOL/L (ref 21–32)
CO2: 25 MMOL/L (ref 21–32)
CREAT SERPL-MCNC: <0.5 MG/DL (ref 0.9–1.3)
CREAT SERPL-MCNC: <0.5 MG/DL (ref 0.9–1.3)
GFR AFRICAN AMERICAN: >60
GFR AFRICAN AMERICAN: >60
GFR NON-AFRICAN AMERICAN: >60
GFR NON-AFRICAN AMERICAN: >60
GLUCOSE BLD-MCNC: 100 MG/DL (ref 70–99)
GLUCOSE BLD-MCNC: 109 MG/DL (ref 70–99)
MAGNESIUM: 2 MG/DL (ref 1.8–2.4)
MAGNESIUM: 2 MG/DL (ref 1.8–2.4)
POTASSIUM REFLEX MAGNESIUM: 3.3 MMOL/L (ref 3.5–5.1)
POTASSIUM SERPL-SCNC: 3 MMOL/L (ref 3.5–5.1)
SODIUM BLD-SCNC: 130 MMOL/L (ref 136–145)
SODIUM BLD-SCNC: 134 MMOL/L (ref 136–145)
TOTAL PROTEIN: 5.8 G/DL (ref 6.4–8.2)

## 2020-07-01 PROCEDURE — 71045 X-RAY EXAM CHEST 1 VIEW: CPT

## 2020-07-01 PROCEDURE — 6360000002 HC RX W HCPCS: Performed by: ORTHOPAEDIC SURGERY

## 2020-07-01 PROCEDURE — 6370000000 HC RX 637 (ALT 250 FOR IP): Performed by: INTERNAL MEDICINE

## 2020-07-01 PROCEDURE — 2500000003 HC RX 250 WO HCPCS: Performed by: PEDIATRICS

## 2020-07-01 PROCEDURE — 6370000000 HC RX 637 (ALT 250 FOR IP): Performed by: ORTHOPAEDIC SURGERY

## 2020-07-01 PROCEDURE — 80076 HEPATIC FUNCTION PANEL: CPT

## 2020-07-01 PROCEDURE — 96367 TX/PROPH/DG ADDL SEQ IV INF: CPT

## 2020-07-01 PROCEDURE — 6360000002 HC RX W HCPCS: Performed by: NURSE PRACTITIONER

## 2020-07-01 PROCEDURE — 96375 TX/PRO/DX INJ NEW DRUG ADDON: CPT

## 2020-07-01 PROCEDURE — 6360000002 HC RX W HCPCS: Performed by: INTERNAL MEDICINE

## 2020-07-01 PROCEDURE — 2580000003 HC RX 258: Performed by: ORTHOPAEDIC SURGERY

## 2020-07-01 PROCEDURE — 96376 TX/PRO/DX INJ SAME DRUG ADON: CPT

## 2020-07-01 PROCEDURE — G0378 HOSPITAL OBSERVATION PER HR: HCPCS

## 2020-07-01 PROCEDURE — 6360000002 HC RX W HCPCS

## 2020-07-01 PROCEDURE — 83735 ASSAY OF MAGNESIUM: CPT

## 2020-07-01 PROCEDURE — 80048 BASIC METABOLIC PNL TOTAL CA: CPT

## 2020-07-01 PROCEDURE — 36415 COLL VENOUS BLD VENIPUNCTURE: CPT

## 2020-07-01 PROCEDURE — 96366 THER/PROPH/DIAG IV INF ADDON: CPT

## 2020-07-01 PROCEDURE — 96372 THER/PROPH/DIAG INJ SC/IM: CPT

## 2020-07-01 PROCEDURE — 99291 CRITICAL CARE FIRST HOUR: CPT | Performed by: INTERNAL MEDICINE

## 2020-07-01 RX ORDER — PHENOBARBITAL SODIUM 65 MG/ML
130 INJECTION INTRAMUSCULAR ONCE
Status: COMPLETED | OUTPATIENT
Start: 2020-07-01 | End: 2020-07-01

## 2020-07-01 RX ORDER — ZIPRASIDONE MESYLATE 20 MG/ML
10 INJECTION, POWDER, LYOPHILIZED, FOR SOLUTION INTRAMUSCULAR ONCE
Status: COMPLETED | OUTPATIENT
Start: 2020-07-01 | End: 2020-07-01

## 2020-07-01 RX ORDER — PHENOBARBITAL SODIUM 65 MG/ML
260 INJECTION INTRAMUSCULAR ONCE
Status: COMPLETED | OUTPATIENT
Start: 2020-07-01 | End: 2020-07-01

## 2020-07-01 RX ORDER — CHLORDIAZEPOXIDE HYDROCHLORIDE 25 MG/1
25 CAPSULE, GELATIN COATED ORAL 3 TIMES DAILY
Status: DISCONTINUED | OUTPATIENT
Start: 2020-07-01 | End: 2020-07-03

## 2020-07-01 RX ORDER — DEXMEDETOMIDINE HYDROCHLORIDE 4 UG/ML
0.2 INJECTION, SOLUTION INTRAVENOUS CONTINUOUS
Status: DISCONTINUED | OUTPATIENT
Start: 2020-07-01 | End: 2020-07-02

## 2020-07-01 RX ORDER — HYDROMORPHONE HCL 110MG/55ML
PATIENT CONTROLLED ANALGESIA SYRINGE INTRAVENOUS
Status: COMPLETED
Start: 2020-07-01 | End: 2020-07-01

## 2020-07-01 RX ORDER — HYDROMORPHONE HCL 110MG/55ML
1 PATIENT CONTROLLED ANALGESIA SYRINGE INTRAVENOUS
Status: DISCONTINUED | OUTPATIENT
Start: 2020-07-01 | End: 2020-07-04 | Stop reason: HOSPADM

## 2020-07-01 RX ADMIN — HYDROMORPHONE HYDROCHLORIDE 1 MG: 2 INJECTION INTRAMUSCULAR; INTRAVENOUS; SUBCUTANEOUS at 22:45

## 2020-07-01 RX ADMIN — LORAZEPAM 2 MG: 2 INJECTION, SOLUTION INTRAMUSCULAR; INTRAVENOUS at 05:18

## 2020-07-01 RX ADMIN — Medication 10 ML: at 07:56

## 2020-07-01 RX ADMIN — LORAZEPAM 4 MG: 2 INJECTION, SOLUTION INTRAMUSCULAR; INTRAVENOUS at 04:19

## 2020-07-01 RX ADMIN — Medication 0.8 MCG/KG/HR: at 15:27

## 2020-07-01 RX ADMIN — Medication 0.2 MCG/KG/HR: at 07:09

## 2020-07-01 RX ADMIN — LORAZEPAM 4 MG: 2 INJECTION, SOLUTION INTRAMUSCULAR; INTRAVENOUS at 03:23

## 2020-07-01 RX ADMIN — HYDROMORPHONE HYDROCHLORIDE 1 MG: 2 INJECTION INTRAMUSCULAR; INTRAVENOUS; SUBCUTANEOUS at 08:09

## 2020-07-01 RX ADMIN — LORAZEPAM 4 MG: 2 INJECTION, SOLUTION INTRAMUSCULAR; INTRAVENOUS at 01:34

## 2020-07-01 RX ADMIN — POTASSIUM CHLORIDE 20 MEQ: 20 TABLET, EXTENDED RELEASE ORAL at 07:52

## 2020-07-01 RX ADMIN — ZIPRASIDONE MESYLATE 10 MG: 20 INJECTION, POWDER, LYOPHILIZED, FOR SOLUTION INTRAMUSCULAR at 03:26

## 2020-07-01 RX ADMIN — PHENOBARBITAL SODIUM 130 MG: 65 INJECTION INTRAMUSCULAR at 05:15

## 2020-07-01 RX ADMIN — PHENOBARBITAL SODIUM 260 MG: 65 INJECTION INTRAMUSCULAR at 03:16

## 2020-07-01 RX ADMIN — LORAZEPAM 4 MG: 2 INJECTION, SOLUTION INTRAMUSCULAR; INTRAVENOUS at 06:39

## 2020-07-01 RX ADMIN — LORAZEPAM 4 MG: 2 INJECTION, SOLUTION INTRAMUSCULAR; INTRAVENOUS at 00:11

## 2020-07-01 RX ADMIN — HYDROMORPHONE HYDROCHLORIDE 1 MG: 2 INJECTION, SOLUTION INTRAMUSCULAR; INTRAVENOUS; SUBCUTANEOUS at 08:09

## 2020-07-01 RX ADMIN — HYDROMORPHONE HYDROCHLORIDE 1 MG: 2 INJECTION INTRAMUSCULAR; INTRAVENOUS; SUBCUTANEOUS at 11:34

## 2020-07-01 RX ADMIN — Medication 10 ML: at 19:34

## 2020-07-01 RX ADMIN — IBUPROFEN 600 MG: 600 TABLET, FILM COATED ORAL at 07:53

## 2020-07-01 RX ADMIN — CHLORDIAZEPOXIDE HYDROCHLORIDE 25 MG: 25 CAPSULE ORAL at 19:33

## 2020-07-01 RX ADMIN — HYDROMORPHONE HYDROCHLORIDE 1 MG: 2 INJECTION INTRAMUSCULAR; INTRAVENOUS; SUBCUTANEOUS at 19:33

## 2020-07-01 RX ADMIN — SENNOSIDES AND DOCUSATE SODIUM 1 TABLET: 8.6; 5 TABLET ORAL at 09:41

## 2020-07-01 RX ADMIN — ACETAMINOPHEN 650 MG: 325 TABLET, FILM COATED ORAL at 11:40

## 2020-07-01 RX ADMIN — CHLORDIAZEPOXIDE HYDROCHLORIDE 25 MG: 25 CAPSULE ORAL at 09:41

## 2020-07-01 RX ADMIN — LORAZEPAM 4 MG: 2 INJECTION, SOLUTION INTRAMUSCULAR; INTRAVENOUS at 02:29

## 2020-07-01 RX ADMIN — SENNOSIDES AND DOCUSATE SODIUM 1 TABLET: 8.6; 5 TABLET ORAL at 19:33

## 2020-07-01 ASSESSMENT — PAIN SCALES - GENERAL
PAINLEVEL_OUTOF10: 9
PAINLEVEL_OUTOF10: 0
PAINLEVEL_OUTOF10: 7
PAINLEVEL_OUTOF10: 7
PAINLEVEL_OUTOF10: 8
PAINLEVEL_OUTOF10: 5

## 2020-07-01 NOTE — PROGRESS NOTES
Physical Therapy    Per chart review, pt transferred from C5 to ICU due to worsening ETOH withdrawal and agitation. Will need new PT orders to resume therapy services when pt is medically stable. Thank you.     Chiara Prieto  PT, DPT #[id number]

## 2020-07-01 NOTE — PROGRESS NOTES
Rounds with critical care team. New orders to recheck BMP this evening after electrolytes replaced, check hepatic panel, and obtain chest xray. Start patient on librium. Pt's son Mendez Guzman at bedside. Updated on plan of care. Pt's wife Matthew Shah called this RN, also gave her update on plan of care.

## 2020-07-01 NOTE — PROGRESS NOTES
Nurse sent ortho message about patient non compliance with shoulder sling and shoulder is swollen and discolored. Patient is scoring a 25 on CIWA scale.

## 2020-07-01 NOTE — PROGRESS NOTES
Hospitalist Progress Note      PCP: No primary care provider on file. Date of Admission: 6/27/2020    Chief Complaint: Fall and right shoulder pain    Hospital Course: See H&P    Subjective:   Patient is up in bed, comfortable, not in distress. Significantly confused, under sedation with Precedex. Medications:  Reviewed    Infusion Medications    dexmedetomidine 1 mcg/kg/hr (07/01/20 1147)     Scheduled Medications    mupirocin   Nasal BID    chlordiazePOXIDE  25 mg Oral TID    acetaminophen  650 mg Oral Q6H    sennosides-docusate sodium  1 tablet Oral BID    nicotine  1 patch Transdermal Daily    sodium chloride flush  10 mL Intravenous 2 times per day     PRN Meds: HYDROmorphone, magnesium hydroxide, sodium chloride flush, acetaminophen **OR** acetaminophen, polyethylene glycol, promethazine **OR** ondansetron, ibuprofen, oxyCODONE-acetaminophen **OR** oxyCODONE-acetaminophen, LORazepam **OR** LORazepam **OR** LORazepam **OR** LORazepam **OR** LORazepam **OR** LORazepam **OR** LORazepam **OR** LORazepam      Intake/Output Summary (Last 24 hours) at 7/1/2020 1259  Last data filed at 7/1/2020 0900  Gross per 24 hour   Intake 240 ml   Output 575 ml   Net -335 ml       Physical Exam Performed:    /78   Pulse 134   Temp 100.2 °F (37.9 °C) (Bladder)   Resp 17   Ht 6' 3\" (1.905 m)   Wt 160 lb (72.6 kg)   SpO2 95%   BMI 20.00 kg/m²     General appearance: No apparent distress, appears confused  HEENT: Pupils equal, round, and reactive to light. Conjunctivae/corneas clear. Neck: Supple, with full range of motion. No jugular venous distention. Trachea midline. Respiratory:  Normal respiratory effort. Clear to auscultation, bilaterally without Rales/Wheezes/Rhonchi. Cardiovascular: Regular rate and rhythm with normal S1/S2 without murmurs, rubs or gallops. Abdomen: Soft, non-tender, non-distended with normal bowel sounds. Musculoskeletal: No clubbing, cyanosis or edema bilaterally. Right shoulder pain. Skin: Skin color, texture, turgor normal.  No rashes or lesions. Neurologic:  Neurovascularly intact without any focal sensory/motor deficits. Cranial nerves: II-XII intact, grossly non-focal.  Psychiatric: Confused  Capillary Refill: Brisk,< 3 seconds   Peripheral Pulses: +2 palpable, equal bilaterally       Labs:   Recent Labs     06/29/20  0909 06/29/20  1718 06/30/20  1116   WBC 10.0 9.4 9.0   HGB 11.9* 9.9* 8.0*   HCT 34.2* 28.9* 22.8*   PLT 69* 61* 72*     Recent Labs     06/29/20  0909 06/30/20  1116 07/01/20  0636   * 128* 134*   K 3.4* 3.1* 3.0*   CL 94* 95* 100   CO2 25 23 24   BUN 10 9 5*   CREATININE 0.6* 0.6* <0.5*   CALCIUM 8.9 7.7* 8.2*   PHOS  --  2.1*  --      Recent Labs     07/01/20  0636   AST 63*   ALT 24   BILIDIR <0.2   BILITOT 0.7   ALKPHOS 59     Recent Labs     06/29/20  0909   INR 0.97     No results for input(s): CKTOTAL, TROPONINI in the last 72 hours. Urinalysis:      Lab Results   Component Value Date    NITRU Negative 09/19/2019    WBCUA 0-2 09/19/2019    BACTERIA 1+ 09/19/2019    RBCUA None seen 09/19/2019    BLOODU TRACE-INTACT 09/19/2019    SPECGRAV 1.015 09/19/2019    GLUCOSEU Negative 09/19/2019       Radiology:  XR CHEST PORTABLE   Final Result   Left basilar atelectasis versus airspace disease. XR SHOULDER RIGHT (MIN 2 VIEWS)   Final Result   Intraprocedural fluoroscopic spot images as above. See separate procedure   report for more information. FLUORO FOR SURGICAL PROCEDURES    (Results Pending)           Assessment/Plan:    Active Hospital Problems    Diagnosis    Closed fracture of proximal end of humerus [S42.209A]    ETOH abuse [F10.10]    Humeral head fracture, right, closed, initial encounter [S42291A]     Acute comminuted fx of proximal humerus - initial encounter  Ortho consult. S/p surgery done on 6/29/2020.   Antiemetic, pain management  Continue pain management, wound care and therapy as per surgery.     ETOH abuse  WA protocol  Continue supportive care, monitor. Patient became more confused and requiring higher dose of Ativan overnight, transferred to ICU, started on Precedex drip.   Pulmonary critical care consult.     Tobacco abuse  Nicotine patch     DVT Prophylaxis: Lovenox  Diet: DIET GENERAL;  Dietary Nutrition Supplements: Standard High Calorie Oral Supplement  Code Status: Full Code    PT/OT Eval Status:  ordered    Dispo -more than 2 days    Jillian Olson MD

## 2020-07-01 NOTE — PROGRESS NOTES
Shift handoff from [facility]. Precedex infusion started, pt agitated at this time and verbally aggressive. HR in the 150s.  Will continue to monitor

## 2020-07-01 NOTE — PROGRESS NOTES
Francis taylor called on this patient due to worsening agitation 2/2 alcohol withdrawal. Consulted with my attending regarding this patient's status and ordered phenobarbital x1 and geodon 10mg x1. Spoke with nursing and updated them. Nitin Bustamante.  Raphael PATTERSON

## 2020-07-01 NOTE — CONSULTS
alcohol withdrawal without perceptual disturbances with delirium ([facility] [address] 09/19/2019       History reviewed. No pertinent past medical history. Past Surgical History:   Procedure Laterality Date    NERVE SURGERY Left     PERONEAL NERVE    SHOULDER ARTHROPLASTY Right 6/29/2020    RIGHT REVERSE SHOULDER ARTHROPLASTY performed by Liza Hidalgo DO at / [facility] reviewed. No pertinent family history. Social History     Tobacco Use    Smoking status: Current Every Day Smoker     Packs/day: 1.00    Smokeless tobacco: Never Used   Substance Use Topics    Alcohol use: Yes     Alcohol/week: 14.0 standard drinks     Types: 14 Cans of beer per week     Comment: daily vodka drinker         No Known Allergies      Vital Signs:  Blood pressure 101/64, pulse 77, temperature 98.6 °F (37 °C), temperature source Oral, resp. rate 15, height 6' 3\" (1.905 m), weight 160 lb (72.6 kg), SpO2 93 %.' on RA  Body mass index is 20 kg/m². Intake/Output Summary (Last 24 hours) at 7/2/2020 0806  Last data filed at 7/2/2020 0541  Gross per 24 hour   Intake 986.35 ml   Output 855 ml   Net 131.35 ml         PHYSICAL EXAM:  General:  Well nourished, well developed, in no respiratory distress. Presently sedated, hemodynamically stable. Eyes:  No scleral icterus or periorbital edema. PERRLA  Head: Atraumatic, normocephalic. ENMT: Could not be examined as the patient was sedated. Mucosa appeared dry without obvious lesions. Neck: Short and large neck. No palpable goiter, no lymphadenopathy, no JVD. No tracheal deviation. No S/Q emphysema. No stiff neck. Lymphadenopathy: No cervical, supraclavicular adenopathy. CV: S1, S2, no murmurs, gallops, clicks or rubs. Pulses palpable and symmetrical, strong. Capillary refills in nail beds are brisk. Respiratory: Clear to auscultation  Chest: Equal rise and fall of chest.   GI: Abdomen soft. No organomegaly. Bowel sounds present. : Knowles intact.    Skin: No rashes, lesions, bruises, induration, discharge. Color, texture, turgor normal except around the right shoulder region and right upper arm which shows ecchymosis. Musculoskeletal: Right shoulder and right upper arm edema. No clubbing or cyanosis of nailbeds. No joint swelling, redness. Edema: Right upper extremity  Neuro: Detailed exam not performed, moves all extremities. No obvious neurologic deficit, detailed exam not performed      Results:  CBC:   Recent Labs     06/29/20  0909 06/29/20  1718 06/30/20  1116   WBC 10.0 9.4 9.0   HGB 11.9* 9.9* 8.0*   HCT 34.2* 28.9* 22.8*   .5* 102.5* 99.8   PLT 69* 61* 72*     BMP:   Recent Labs     06/30/20  1116 07/01/20  0636 07/01/20  1835   * 134* 130*   K 3.1* 3.0* 3.3*   CL 95* 100 97*   CO2 23 24 25   PHOS 2.1*  --   --    BUN 9 5* 9   CREATININE 0.6* <0.5* <0.5*       Imaging:  I have reviewed radiology images personally. Xr Chest Portable    Result Date: 7/1/2020  Left basilar atelectasis versus airspace disease. ASSESSMENT AND PLAN:  Alcohol withdrawal with delirium tremens. Presently sedated with Precedex, continue phenobarbital.  Will start oral benzodiazepine. Low-grade fever. Concern for aspiration, repeat CXR with questionable left lower lobe infiltrate. Hold antibiotic for now. Macrocytic anemia. Partly due to blood loss from surgery, partly probably from alcoholism. Follow profile. Hold transfusion for hemoglobin <7 g/dL. Thrombocytopenia. Likely related to alcoholism. Follow profile. Hyponatremia. Mild. Again, likely related to alcoholism. Follow profile. Hypokalemia, hypomagnesemia. Replace and monitor. Risk of torsades with low magnesium levels. Alcoholism. The patient has been receiving multivitamins. To be discussed once he is awake and oriented. Smoker. Nicotine patch. DVT prophylaxis. SCDs as he is thrombocytopenic.       Echocardiogram: None in EMR  PFT: None in EMR    I have examined this patient and available

## 2020-07-01 NOTE — PROGRESS NOTES
Pt transferred via bed from . Report given by Franko Howell RN at bedside. Placing pt on ICU bedside monitor. Pt is confused and restless. R arm bruising, abrasions noted with +2/+3 edema. Restraints in place. Chlorohexidine wipes used. Mepilex placed on coccyx.

## 2020-07-01 NOTE — PROGRESS NOTES
4 Eyes Skin Assessment     The patient is being assess for  Shift Handoff    I agree that 2 RN's have performed a thorough Head to Toe Skin Assessment on the patient. ALL assessment sites listed below have been assessed. Areas assessed by both nurses: Cristina Levy  [x]   Head, Face, and Ears   [x]   Shoulders, Back, and Chest  [x]   Arms, Elbows, and Hands   [x]   Coccyx, Sacrum, and Ischum  [x]   Legs, Feet, and Heels        Does the Patient have Skin Breakdown?   No         To Prevention initiated:  No   Wound Care Orders initiated:  NA      [facility] nurse consulted for Pressure Injury (Stage 3,4, Unstageable, DTI, NWPT, and Complex wounds):  NA      Nurse 1 eSignature: Electronically signed by Tika Montiel RN on 7/1/20 at 6:02 PM EDT    **SHARE this note so that the co-signing nurse is able to place an eSignature**    Nurse 2 eSignature: {Esignature:[id number]}

## 2020-07-01 NOTE — PROGRESS NOTES
Code violet was called due to patient hitting staff. Patient was given phenobarbital and geodon per order at 0316/0326. At [phone] patient still  aggressive hitting nurse in \"chest\" and laughing about it. Patient yelling \"mother fuckers\" at pca and nurses. Patient yelling for family members who are not in the room. Also, telling nurse he will piss on her computer. Patient wants knife to cut staff and get out of restraints. Patient arm is more discolored and swollen. No pulse felt manually, used doppler for pulse. Ortho notified about patient condition.

## 2020-07-01 NOTE — PROGRESS NOTES
Department of Orthopedic Surgery  Physician Assistant   Progress Note    Subjective:       Systemic or Specific Complaints: Confused. Tsf to ICU overnight for aggressive behavior and agitation likely 2/2 active ETOH withdrawal.    Objective:     Patient Vitals for the past 24 hrs:   BP Temp Temp src Pulse Resp SpO2   07/01/20 1100 132/78 100.2 °F (37.9 °C) Bladder 134 17 95 %   07/01/20 1000 96/80 100.6 °F (38.1 °C) Bladder 97 11 97 %   07/01/20 0900 103/71 -- -- 104 13 96 %   07/01/20 0800 (!) 148/115 100.6 °F (38.1 °C) -- 136 18 95 %   07/01/20 0700 139/85 100.7 °F (38.2 °C) Bladder 126 23 99 %   07/01/20 0635 124/85 99.5 °F (37.5 °C) Axillary 109 22 96 %   07/01/20 0242 (!) 148/86 97.9 °F (36.6 °C) Oral 118 18 93 %   06/30/20 1904 118/76 97.7 °F (36.5 °C) Oral 117 18 92 %   06/30/20 1505 107/69 98.4 °F (36.9 °C) Axillary 111 18 96 %       General: alert, appears stated age and cooperative   Wound: Wound clean and dry no evidence of infection. , No Drainage, Wound Intact and Positive for Edema entire right arm. Sling on. Motion: Painful range of Motion in affected extremity   DVT Exam: No evidence of DVT seen on physical exam.     Additional exam:     Data Review  CBC:   Lab Results   Component Value Date    WBC 9.0 06/30/2020    RBC 2.29 06/30/2020    HGB 8.0 06/30/2020    HCT 22.8 06/30/2020    PLT 72 06/30/2020       Renal:   Lab Results   Component Value Date     07/01/2020    K 3.0 07/01/2020    K 3.8 06/27/2020     07/01/2020    CO2 24 07/01/2020    BUN 5 07/01/2020    CREATININE <0.5 07/01/2020    GLUCOSE 109 07/01/2020    CALCIUM 8.2 07/01/2020            Assessment:      right reverse TSA for fracture. Plan:      1:  Continue IM/ICU team recs. Continue CIWA and PT as able.   2:  Continue Deep venous thrombosis prophylaxis  3:  Continue Pain Control    Mague Nims

## 2020-07-01 NOTE — PROGRESS NOTES
Nurse has given patient 4mg ativan IV every hour over the last 4 hours. Patient is agitated and anxious. CIWA score at 0229 was 25. Patient will not leave sling in place (due to recent shoulder surgery) and then used sling for a toilet. Nurse is currently sitting in patients room for safety. Message was sent to hospitalitis requesting transfer for patient. Nurse is concerned about safety of patient and shoulder placement due to new surgery on 6/29 of shoulder.

## 2020-07-02 PROBLEM — F10.20 ALCOHOLISM (HCC): Status: ACTIVE | Noted: 2020-07-02

## 2020-07-02 PROCEDURE — 96376 TX/PRO/DX INJ SAME DRUG ADON: CPT

## 2020-07-02 PROCEDURE — 2580000003 HC RX 258: Performed by: ORTHOPAEDIC SURGERY

## 2020-07-02 PROCEDURE — 6370000000 HC RX 637 (ALT 250 FOR IP): Performed by: ORTHOPAEDIC SURGERY

## 2020-07-02 PROCEDURE — 6370000000 HC RX 637 (ALT 250 FOR IP): Performed by: INTERNAL MEDICINE

## 2020-07-02 PROCEDURE — 2500000003 HC RX 250 WO HCPCS: Performed by: PEDIATRICS

## 2020-07-02 PROCEDURE — 1200000000 HC SEMI PRIVATE

## 2020-07-02 PROCEDURE — 96366 THER/PROPH/DIAG IV INF ADDON: CPT

## 2020-07-02 PROCEDURE — 6360000002 HC RX W HCPCS: Performed by: INTERNAL MEDICINE

## 2020-07-02 PROCEDURE — 99232 SBSQ HOSP IP/OBS MODERATE 35: CPT | Performed by: INTERNAL MEDICINE

## 2020-07-02 RX ADMIN — OXYCODONE HYDROCHLORIDE AND ACETAMINOPHEN 1 TABLET: 5; 325 TABLET ORAL at 18:25

## 2020-07-02 RX ADMIN — HYDROMORPHONE HYDROCHLORIDE 1 MG: 2 INJECTION INTRAMUSCULAR; INTRAVENOUS; SUBCUTANEOUS at 07:58

## 2020-07-02 RX ADMIN — ACETAMINOPHEN 650 MG: 325 TABLET, FILM COATED ORAL at 01:24

## 2020-07-02 RX ADMIN — HYDROMORPHONE HYDROCHLORIDE 1 MG: 2 INJECTION INTRAMUSCULAR; INTRAVENOUS; SUBCUTANEOUS at 01:24

## 2020-07-02 RX ADMIN — Medication 0.6 MCG/KG/HR: at 01:23

## 2020-07-02 RX ADMIN — CHLORDIAZEPOXIDE HYDROCHLORIDE 25 MG: 25 CAPSULE ORAL at 13:19

## 2020-07-02 RX ADMIN — Medication 10 ML: at 21:05

## 2020-07-02 RX ADMIN — IBUPROFEN 600 MG: 600 TABLET, FILM COATED ORAL at 21:12

## 2020-07-02 RX ADMIN — CHLORDIAZEPOXIDE HYDROCHLORIDE 25 MG: 25 CAPSULE ORAL at 09:52

## 2020-07-02 RX ADMIN — MUPIROCIN: 20 OINTMENT TOPICAL at 09:57

## 2020-07-02 RX ADMIN — ACETAMINOPHEN 650 MG: 325 TABLET, FILM COATED ORAL at 07:57

## 2020-07-02 RX ADMIN — HYDROMORPHONE HYDROCHLORIDE 1 MG: 2 INJECTION INTRAMUSCULAR; INTRAVENOUS; SUBCUTANEOUS at 04:15

## 2020-07-02 RX ADMIN — Medication 10 ML: at 09:53

## 2020-07-02 RX ADMIN — CHLORDIAZEPOXIDE HYDROCHLORIDE 25 MG: 25 CAPSULE ORAL at 21:04

## 2020-07-02 RX ADMIN — ACETAMINOPHEN 650 MG: 325 TABLET, FILM COATED ORAL at 13:14

## 2020-07-02 ASSESSMENT — PAIN DESCRIPTION - ORIENTATION
ORIENTATION: RIGHT

## 2020-07-02 ASSESSMENT — PAIN SCALES - GENERAL
PAINLEVEL_OUTOF10: 7
PAINLEVEL_OUTOF10: 5
PAINLEVEL_OUTOF10: 7
PAINLEVEL_OUTOF10: 1
PAINLEVEL_OUTOF10: 3
PAINLEVEL_OUTOF10: 0
PAINLEVEL_OUTOF10: 0
PAINLEVEL_OUTOF10: 9
PAINLEVEL_OUTOF10: 1
PAINLEVEL_OUTOF10: 9
PAINLEVEL_OUTOF10: 2
PAINLEVEL_OUTOF10: 8
PAINLEVEL_OUTOF10: 5
PAINLEVEL_OUTOF10: 9

## 2020-07-02 ASSESSMENT — PAIN DESCRIPTION - PAIN TYPE
TYPE: SURGICAL PAIN
TYPE: SURGICAL PAIN

## 2020-07-02 ASSESSMENT — PAIN DESCRIPTION - LOCATION
LOCATION: SHOULDER
LOCATION: ARM;SHOULDER
LOCATION: ARM;SHOULDER

## 2020-07-02 NOTE — PROGRESS NOTES
0930 rounds done with Dr Jose M Yao & team.  21  vargas removed per order. 1050 Precedex gtt turned off, patient a/o, calm & cooperative. Jesse Quevedo

## 2020-07-02 NOTE — PROGRESS NOTES
4 Eyes Skin Assessment     The patient is being assess for   Shift Handoff    I agree that 2 RN's have performed a thorough Head to Toe Skin Assessment on the patient. ALL assessment sites listed below have been assessed. Areas assessed by both nurses:   [x]   Head, Face, and Ears   [x]   Shoulders, Back, and Chest, Abdomen  [x]   Arms, Elbows, and Hands   [x]   Coccyx, Sacrum, and Ischium  [x]   Legs, Feet, and Heels            **SHARE this note so that the co-signing nurse is able to place an eSignature**    Co-signer eSignature: {Esignature:[id number]}    Does the Patient have Skin Breakdown?   No          To Prevention initiated:  Yes   Wound Care Orders initiated:  No      WOC nurse consulted for Pressure Injury (Stage 3,4, Unstageable, DTI, NWPT, Complex wounds)and New or Established Ostomies:  NA      Primary Nurse eSignature: Electronically signed by Poly Calix RN on 7/2/20 at 7:27 AM EDT

## 2020-07-02 NOTE — CARE COORDINATION
CASE MANAGEMENT INITIAL ASSESSMENT      Reviewed chart and completed assessment with: patient and wife. Explained Case Management role/services. Primary contact information: Kin Novak [phone]    Admit date/status: 6/27/20  Diagnosis: humeral head fracture   Is this a Readmission?:  no    Insurance: [address] required for SNF -yes   3 night stay required -no    Living arrangements, Adls, care needs, prior to admission: 2 story house. Able to stay on first floor. Lives with wife and daughter    Transportation: private     Durable Medical Equipment at home: none    Services in the home and/or outpatient, prior to admission: none    PT/OT recs: 2 [place] Notification (HEN): not initiated    Barriers to discharge: alcoholism     Plan/comments:   Spoke to patient and wife at bedside. Discussed alcoholism. Pt stated that he is in a [facility] program.  Wife pulled writer outside of room and stated he is not in any program.  They are staging an intervention and his family is private paying for him to go to a program in [place] but put does not know this yet. This was to happen this weekend. They are replanning for when medically cleared. His brother Kin Novak is heading this up. [facility] the above. Calls placed to [place] [id number] and [facility] and they do not take take pt insurance. Call placed to [facility] and Domingo Ingram is reviewing this now.       ECOC on chart for MD signature      Simba Kebede RN

## 2020-07-02 NOTE — PROGRESS NOTES
C5 RN at bedside & report given. Skin checked. Mepilex to coccyx. Multi bruises to right side, arms & legs from fall prior to admission. Belongings sent with patient. To C5 via w/c by RN & PCA.  4 Eyes Skin Assessment     The patient is being assess for   Transfer to New Unit    I agree that 2 RN's have performed a thorough Head to Toe Skin Assessment on the patient. ALL assessment sites listed below have been assessed. Areas assessed by both nurses:   [x]   Head, Face, and Ears   [x]   Shoulders, Back, and Chest, Abdomen  [x]   Arms, Elbows, and Hands   [x]   Coccyx, Sacrum, and Ischium  [x]   Legs, Feet, and Heels            **SHARE this note so that the co-signing nurse is able to place an eSignature**    Co-signer eSignature: {Esignature:[id number]}    Does the Patient have Skin Breakdown?   No          To Prevention initiated:  Yes   Wound Care Orders initiated:  No      WOC nurse consulted for Pressure Injury (Stage 3,4, Unstageable, DTI, NWPT, Complex wounds)and New or Established Ostomies:  NA      Primary Nurse eSignature: Electronically signed by Giovanny Garzon RN on 7/2/20 at 7:58 PM EDT

## 2020-07-02 NOTE — CARE COORDINATION
Writer went to bedside for initial assessment. Pt asleep in bed. Nursing asked that writer not wake pt as he is just now resting. CM will attempt assessment again later today.  Emi Guillen RN

## 2020-07-02 NOTE — PROGRESS NOTES
Nutrition Assessment    Type and Reason for Visit: Reassess    Nutrition Recommendations:   1. Continue general diet  2. Continue Ensure ONS with meals   3. Monitor nutrition adequacy, pertinent labs, bowel habits, wt changes, and clinical progress    Nutrition Assessment: Follow up: Pt transferred to the ICU 2/2 EtOH w/d. Ordered on precedex gtt. He is A&O. Hungry and about to order breakfast. ONS ordered to meals. Malnutrition Assessment:  · Malnutrition Status: No malnutrition    Nutrition Risk Level: Moderate    Nutrient Needs:  · Estimated Daily Total Kcal: 6561-2533 kcal  · Estimated Daily Protein (g):  g  · Estimated Daily Total Fluid (ml/day): 1 ml/kcal    Nutrition Diagnosis:   · Problem: Inadequate oral intake  · Etiology: related to Insufficient energy/nutrient consumption     Signs and symptoms:  as evidenced by Diet history of poor intake, Patient report of(alcohol dependence)    Objective Information:  · Nutrition-Focused Physical Findings: Labs Reviewed: K 3.3. · Wound Type: (surgical incision)  · Current Nutrition Therapies:  · Oral Diet Orders: General   · Oral Diet intake: %  · Oral Nutrition Supplement (ONS) Orders: Standard High Calorie Oral Supplement  · ONS intake: 0%  · Anthropometric Measures:  · Ht: 6' 3\" (190.5 cm)   · Current Body Wt: 160 lb (72.6 kg)(stated weight)  · Usual Body Wt: 160 lb (72.6 kg)(per pt)  · % Weight Change:  ,  wt in EMR of 148 lb in Sept. 2019, unclear weight loss  · Ideal Body Wt: 196 lb (88.9 kg),  · BMI Classification: BMI 18.5 - 24.9 Normal Weight    Nutrition Interventions:   Continue current diet, Start ONS  Continued Inpatient Monitoring    Nutrition Evaluation:   · Evaluation: Progressing toward goals   · Goals: Pt will consume greater than 50% of meals and ONS this admission    · Monitoring: Nutrition Progression, Meal Intake, Supplement Intake, Weight, Pertinent Labs      Electronically signed by Sage Livingston.  Yohan Benitez RD, LD on 7/2/20 at 12:24 PM EDT    Contact Number: [phone]

## 2020-07-02 NOTE — PROGRESS NOTES
Up to toilet for large loose stool & voided. Up to chair, gait unsteady, assist 1. Wife at bedside.  Ella Butt

## 2020-07-02 NOTE — PROGRESS NOTES
Hospitalist Progress Note      PCP: No primary care provider on file. Date of Admission: 6/27/2020    Chief Complaint: Fall and right shoulder pain    Hospital Course: See H&P    Subjective:   Patient is up in bed, comfortable, not in distress. Significantly confused, under sedation with Precedex. No new event overnight noted. Medications:  Reviewed    Infusion Medications    dexmedetomidine 0.1 mcg/kg/hr (07/02/20 1840)     Scheduled Medications    mupirocin   Nasal BID    chlordiazePOXIDE  25 mg Oral TID    acetaminophen  650 mg Oral Q6H    sennosides-docusate sodium  1 tablet Oral BID    nicotine  1 patch Transdermal Daily    sodium chloride flush  10 mL Intravenous 2 times per day     PRN Meds: HYDROmorphone, magnesium hydroxide, sodium chloride flush, acetaminophen **OR** acetaminophen, polyethylene glycol, promethazine **OR** ondansetron, ibuprofen, oxyCODONE-acetaminophen **OR** oxyCODONE-acetaminophen, LORazepam **OR** LORazepam **OR** LORazepam **OR** LORazepam **OR** LORazepam **OR** LORazepam **OR** LORazepam **OR** LORazepam      Intake/Output Summary (Last 24 hours) at 7/2/2020 1100  Last data filed at 7/2/2020 0541  Gross per 24 hour   Intake 986.35 ml   Output 725 ml   Net 261.35 ml       Physical Exam Performed:    /65   Pulse 78   Temp 97.7 °F (36.5 °C) (Oral)   Resp 16   Ht 6' 3\" (1.905 m)   Wt 160 lb (72.6 kg)   SpO2 95%   BMI 20.00 kg/m²     General appearance: No apparent distress, appears more awake alert and oriented. HEENT: Pupils equal, round, and reactive to light. Conjunctivae/corneas clear. Neck: Supple, with full range of motion. No jugular venous distention. Trachea midline. Respiratory:  Normal respiratory effort. Clear to auscultation, bilaterally without Rales/Wheezes/Rhonchi. Cardiovascular: Regular rate and rhythm with normal S1/S2 without murmurs, rubs or gallops.   Abdomen: Soft, non-tender, non-distended with normal bowel became more confused and requiring higher dose of Ativan overnight, transferred to ICU, started on Precedex drip. Pulmonary critical care consult.   Clinically improving gradually, try to wean off Precedex.     Tobacco abuse  Nicotine patch     DVT Prophylaxis: Lovenox  Diet: DIET GENERAL;  Dietary Nutrition Supplements: Standard High Calorie Oral Supplement  Code Status: Full Code    PT/OT Eval Status:  ordered    Dispo -in 2 to 4 days    Tabatha Jones MD

## 2020-07-02 NOTE — PROGRESS NOTES
Spoke with patient who is alert and oriented X 4 at this time. However the patient does have some confusion from time to time. We had a conversation about why he is here and about his drinking habits. He states that he is willing to seek help again. Restraints have been removed at this time and patient is aware that he must call out when needed. Will continue to monitor.

## 2020-07-03 VITALS
WEIGHT: 160 LBS | TEMPERATURE: 98.3 F | DIASTOLIC BLOOD PRESSURE: 73 MMHG | RESPIRATION RATE: 14 BRPM | BODY MASS INDEX: 19.89 KG/M2 | SYSTOLIC BLOOD PRESSURE: 118 MMHG | OXYGEN SATURATION: 95 % | HEIGHT: 75 IN | HEART RATE: 99 BPM

## 2020-07-03 PROCEDURE — 1200000000 HC SEMI PRIVATE

## 2020-07-03 PROCEDURE — 6370000000 HC RX 637 (ALT 250 FOR IP): Performed by: INTERNAL MEDICINE

## 2020-07-03 PROCEDURE — 2580000003 HC RX 258: Performed by: ORTHOPAEDIC SURGERY

## 2020-07-03 PROCEDURE — 6370000000 HC RX 637 (ALT 250 FOR IP): Performed by: ORTHOPAEDIC SURGERY

## 2020-07-03 PROCEDURE — 6370000000 HC RX 637 (ALT 250 FOR IP): Performed by: NURSE PRACTITIONER

## 2020-07-03 RX ORDER — CHLORDIAZEPOXIDE HYDROCHLORIDE 25 MG/1
25 CAPSULE, GELATIN COATED ORAL 2 TIMES DAILY
Status: DISCONTINUED | OUTPATIENT
Start: 2020-07-03 | End: 2020-07-04

## 2020-07-03 RX ADMIN — OXYCODONE HYDROCHLORIDE AND ACETAMINOPHEN 1 TABLET: 5; 325 TABLET ORAL at 21:03

## 2020-07-03 RX ADMIN — Medication 10 ML: at 20:55

## 2020-07-03 RX ADMIN — OXYCODONE HYDROCHLORIDE AND ACETAMINOPHEN 1 TABLET: 5; 325 TABLET ORAL at 00:32

## 2020-07-03 RX ADMIN — Medication 10 ML: at 07:52

## 2020-07-03 RX ADMIN — CHLORDIAZEPOXIDE HYDROCHLORIDE 25 MG: 25 CAPSULE ORAL at 20:55

## 2020-07-03 RX ADMIN — CHLORDIAZEPOXIDE HYDROCHLORIDE 25 MG: 25 CAPSULE ORAL at 07:51

## 2020-07-03 ASSESSMENT — PAIN SCALES - GENERAL
PAINLEVEL_OUTOF10: 4
PAINLEVEL_OUTOF10: 0
PAINLEVEL_OUTOF10: 4
PAINLEVEL_OUTOF10: 4
PAINLEVEL_OUTOF10: 0
PAINLEVEL_OUTOF10: 3

## 2020-07-03 NOTE — PROGRESS NOTES
Department of Orthopedic Surgery  Physician Assistant   Progress Note        Subjective:  No complaints. Doing well postoperatively. pain is perceived as mild (1-3  pain scale). EtOh withdrawal being managed by medicine. Vitals  VITALS:  /74   Pulse 86   Temp 97.7 °F (36.5 °C) (Oral)   Resp 16   Ht 6' 3\" (1.905 m)   Wt 160 lb (72.6 kg)   SpO2 96%   BMI 20.00 kg/m²     PHYSICAL EXAM:    Orientation:  alert and oriented to person, place and time    Right Upper Extremity    Incision:  dressing in place, clean, dry and intact. Sling in place    Upper Extremity Motor :    Moving hand and fingers without difficulty today. Upper Extremity Sensory:   Neurovascularly intact to gross sensation and touch in upper extremities. Pulses:    present 2+ bilaterally upper extremities. Abnormal Exam findings:  none    Sling: Intact and will continue to wear during inpatient stay. Brief examination of the bilateral lower extremities shows no abnormalities.  Calves soft and nontender    LABS:    HgB:    Lab Results   Component Value Date    HGB 8.0 06/30/2020     INR:  No results found for: PTINR  CBC with Differential:    Lab Results   Component Value Date    WBC 9.0 06/30/2020    RBC 2.29 06/30/2020    HGB 8.0 06/30/2020    HCT 22.8 06/30/2020    PLT 72 06/30/2020    MCV 99.8 06/30/2020    MCH 35.2 06/30/2020    MCHC 35.2 06/30/2020    RDW 13.3 06/30/2020    LYMPHOPCT 3.2 06/27/2020    MONOPCT 8.6 06/27/2020    BASOPCT 0.3 06/27/2020    MONOSABS 1.1 06/27/2020    LYMPHSABS 0.4 06/27/2020    EOSABS 0.0 06/27/2020    BASOSABS 0.0 06/27/2020     Hemoglobin/Hematocrit:    Lab Results   Component Value Date    HGB 8.0 06/30/2020    HCT 22.8 06/30/2020     BMP:    Lab Results   Component Value Date     07/01/2020    K 3.3 07/01/2020    CL 97 07/01/2020    CO2 25 07/01/2020    BUN 9 07/01/2020    LABALBU 3.2 07/01/2020    CREATININE <0.5 07/01/2020    CALCIUM 8.2 07/01/2020    GFRAA >60 07/01/2020 LABGLOM >60 07/01/2020    GLUCOSE 100 07/01/2020       ASSESSMENT AND PLAN:    Post operative day 4 status post right total reverse total shoulder arthroplasty for fracture. 1: ETOH withdrawal being managed by medicine  2:  D/C Plan:  Home when appropriate per medicine. No other inpatient orthopedic needs. Recommend follow up with Dr Hue Chappell as scheduled.    3:  Continue Pain Control/ ice     Adryan Gold PA-C

## 2020-07-03 NOTE — PROGRESS NOTES
--    BUN 9 5* 9   CREATININE 0.6* <0.5* <0.5*     LIVER PROFILE:   Recent Labs     07/01/20  0636   AST 63*   ALT 24   BILIDIR <0.2   BILITOT 0.7   ALKPHOS 59       Imaging:  I have reviewed radiology images personally. XR CHEST PORTABLE   Final Result   Left basilar atelectasis versus airspace disease. XR SHOULDER RIGHT (MIN 2 VIEWS)   Final Result   Intraprocedural fluoroscopic spot images as above. See separate procedure   report for more information. FLUORO FOR SURGICAL PROCEDURES    (Results Pending)     Xr Shoulder Right (min 2 Views)    Result Date: 6/29/2020  EXAMINATION: SPOT FLUOROSCOPIC IMAGES 6/29/2020 11:32 am TECHNIQUE: Fluoroscopy was provided by the radiology department for procedure. Radiologist was not present during examination. FLUOROSCOPY DOSE AND TYPE OR TIME AND EXPOSURES: 3 seconds, 3 images COMPARISON: 06/27/2020 radiograph HISTORY: ORDERING SYSTEM PROVIDED HISTORY: pain TECHNOLOGIST PROVIDED HISTORY: Reason for exam:->pain Reason for Exam: Pain Acuity: Acute Type of Exam: Initial Intraprocedural imaging. FINDINGS: 3 spot images of the right shoulder were obtained. Fluoroscopic assistance provided for reverse right shoulder arthroplasty. Intraprocedural fluoroscopic spot images as above. See separate procedure report for more information. Xr Shoulder Right (min 2 Views)    Result Date: 6/27/2020  EXAMINATION: THREE XRAY VIEWS OF THE RIGHT SHOULDER 6/27/2020 8:20 pm COMPARISON: None. HISTORY: ORDERING SYSTEM PROVIDED HISTORY: fall with swelling to right shoulder TECHNOLOGIST PROVIDED HISTORY: Reason for exam:->fall with swelling to right shoulder FINDINGS: Comminuted fracture of proximal humerus is seen involving the humeral head extending into the proximal diaphysis. A large fragment involving the humeral head is displaced anteriorly at the glenohumeral joint. No focal consolidation is seen in the lungs. .  No malalignment at the [facility] joint.      Comminuted fracture of the proximal humerus, with a large displaced fracture fragment involving the humeral head which is displaced anteriorly at the glenohumeral joint     Xr Chest Portable    Result Date: 7/1/2020  EXAMINATION: ONE XRAY VIEW OF THE CHEST 7/1/2020 9:34 am COMPARISON: Chest x-ray dated HISTORY: ORDERING SYSTEM PROVIDED HISTORY: poss aspiration TECHNOLOGIST PROVIDED HISTORY: Reason for exam:->poss aspiration FINDINGS: HEART/MEDIASTINUM: The cardiomediastinal silhouette is within normal limits. PLEURA/LUNGS: Left basilar opacity noted reflecting atelectasis versus airspace disease. There are no pleural effusions. There is no appreciable pneumothorax. BONES/SOFT TISSUE: No acute abnormality. Left basilar atelectasis versus airspace disease. Assessment and plan:  Alcohol withdrawal with delirium tremens. Significantly improved, will attempt to discontinue Precedex infusion, continue oral medications on CIWA protocol. Low-grade fever. No evidence of significant fever, no cough or shortness of breath. Macrocytic anemia. Partly due to blood loss from surgery, partly probably from alcoholism. Follow profile. Hold transfusion for hemoglobin <7 g/dL. Thrombocytopenia. Likely related to alcoholism. Follow profile. Hyponatremia. Mild. Again, likely related to alcoholism. Follow profile. Hypokalemia, hypomagnesemia. Replace and monitor. Risk of torsades with low magnesium levels. Alcoholism. The patient has been receiving multivitamins. To be discussed once he is awake and oriented. Smoker. Nicotine patch. DVT prophylaxis. SCDs as he is thrombocytopenic. Multidisciplinary rounds were completed at the bedside with participation from the intensivist, pharmacy, nursing, nutrition. All labs and imaging studies reviewed, discussed. If able to wean off Precedex, the patient can probably be transferred out of ICU.   We will sign off as the patient is stable from a cardiopulmonary standpoint, please call with questions. Thank you for the consultation.         Electronically signed by:  Evens Nielson MD    7/2/2020    8:21 PM.

## 2020-07-03 NOTE — PROGRESS NOTES
Hospitalist Progress Note      PCP: No primary care provider on file. Date of Admission: 6/27/2020    Chief Complaint: Fall and right shoulder pain    Subjective: Sitting up on the edge of the bed. Seems tremulous. Medications:  Reviewed    Infusion Medications     Scheduled Medications    chlordiazePOXIDE  25 mg Oral BID    acetaminophen  650 mg Oral Q6H    sennosides-docusate sodium  1 tablet Oral BID    nicotine  1 patch Transdermal Daily    sodium chloride flush  10 mL Intravenous 2 times per day     PRN Meds: HYDROmorphone, magnesium hydroxide, sodium chloride flush, acetaminophen **OR** acetaminophen, polyethylene glycol, promethazine **OR** ondansetron, ibuprofen, oxyCODONE-acetaminophen **OR** oxyCODONE-acetaminophen, LORazepam **OR** LORazepam **OR** LORazepam **OR** LORazepam **OR** LORazepam **OR** LORazepam **OR** LORazepam **OR** LORazepam      Intake/Output Summary (Last 24 hours) at 7/3/2020 1235  Last data filed at 7/3/2020 1047  Gross per 24 hour   Intake 480 ml   Output 525 ml   Net -45 ml       Physical Exam Performed:    /74   Pulse 86   Temp 97.7 °F (36.5 °C) (Oral)   Resp 16   Ht 6' 3\" (1.905 m)   Wt 160 lb (72.6 kg)   SpO2 96%   BMI 20.00 kg/m²     General appearance: No apparent distress, appears more awake alert and oriented. HEENT: Pupils equal, round, and reactive to light. Conjunctivae/corneas clear. Neck: Supple, with full range of motion. No jugular venous distention. Trachea midline. Respiratory:  Normal respiratory effort. Clear to auscultation, bilaterally without Rales/Wheezes/Rhonchi. Cardiovascular: Regular rate and rhythm with normal S1/S2 without murmurs, rubs or gallops. Abdomen: Soft, non-tender, non-distended with normal bowel sounds. Musculoskeletal: No clubbing, cyanosis or edema bilaterally. Right shoulder pain. Skin: Skin color, texture, turgor normal.  No rashes or lesions.   Neurologic:  Neurovascularly intact without any focal sensory/motor deficits. Cranial nerves: II-XII intact, grossly non-focal.  Psychiatric: Awake alert oriented x3. Tremulous. Capillary Refill: Brisk,< 3 seconds   Peripheral Pulses: +2 palpable, equal bilaterally       Labs:   No results for input(s): WBC, HGB, HCT, PLT in the last 72 hours. Recent Labs     07/01/20  0636 07/01/20  1835   * 130*   K 3.0* 3.3*    97*   CO2 24 25   BUN 5* 9   CREATININE <0.5* <0.5*   CALCIUM 8.2* 8.2*     Recent Labs     07/01/20  0636   AST 63*   ALT 24   BILIDIR <0.2   BILITOT 0.7   ALKPHOS 59       Urinalysis:      Lab Results   Component Value Date    NITRU Negative 09/19/2019    WBCUA 0-2 09/19/2019    BACTERIA 1+ 09/19/2019    RBCUA None seen 09/19/2019    BLOODU TRACE-INTACT 09/19/2019    SPECGRAV 1.015 09/19/2019    GLUCOSEU Negative 09/19/2019       Radiology:  XR CHEST PORTABLE   Final Result   Left basilar atelectasis versus airspace disease. XR SHOULDER RIGHT (MIN 2 VIEWS)   Final Result   Intraprocedural fluoroscopic spot images as above. See separate procedure   report for more information. FLUORO FOR SURGICAL PROCEDURES    (Results Pending)           Assessment/Plan:    Active Hospital Problems    Diagnosis    Alcoholism ([facility] [address] [F10.20]    Closed fracture of proximal end of humerus [S42.209A]    ETOH abuse [F10.10]    Humeral head fracture, right, closed, initial encounter [S42.291A]     Acute comminuted fx of proximal humerus - initial encounter  Ortho consult. S/p surgery done on 6/29/2020. Antiemetic, pain management  Continue pain management, wound care and therapy as per surgery.     ETOH abuse  CIWA protocol  Continue supportive care, monitor.   Continue scheduled Librium taper.     Tobacco abuse  Nicotine patch     DVT Prophylaxis: Lovenox  Diet: DIET GENERAL;  Dietary Nutrition Supplements: Standard High Calorie Oral Supplement  Code Status: Full Code    PT/OT Eval Status:  ordered    Dispo - hopefully this weekend    Angus Colindres Aman Rosa, APRN - CNP

## 2020-07-04 PROCEDURE — 6370000000 HC RX 637 (ALT 250 FOR IP): Performed by: ORTHOPAEDIC SURGERY

## 2020-07-04 PROCEDURE — 6370000000 HC RX 637 (ALT 250 FOR IP): Performed by: NURSE PRACTITIONER

## 2020-07-04 RX ORDER — CHLORDIAZEPOXIDE HYDROCHLORIDE 25 MG/1
25 CAPSULE, GELATIN COATED ORAL NIGHTLY
Status: DISCONTINUED | OUTPATIENT
Start: 2020-07-05 | End: 2020-07-04 | Stop reason: HOSPADM

## 2020-07-04 RX ORDER — CHLORDIAZEPOXIDE HYDROCHLORIDE 25 MG/1
25 CAPSULE, GELATIN COATED ORAL NIGHTLY
Qty: 3 CAPSULE | Refills: 0 | Status: SHIPPED | OUTPATIENT
Start: 2020-07-05 | End: 2020-07-08

## 2020-07-04 RX ADMIN — CHLORDIAZEPOXIDE HYDROCHLORIDE 25 MG: 25 CAPSULE ORAL at 08:23

## 2020-07-04 RX ADMIN — ACETAMINOPHEN 650 MG: 325 TABLET, FILM COATED ORAL at 06:45

## 2020-07-04 ASSESSMENT — PAIN SCALES - GENERAL: PAINLEVEL_OUTOF10: 2

## 2020-07-04 NOTE — PLAN OF CARE
Bed in lowest position, wheels locked, 2/4 side rails up, nonskid footwear on. Bed/ chair check alarm in place, call light within reach. Pt instructed to call out when needing assistance. Pt stated understanding. Nurse will continue to monitor.         Problem: Falls - Risk of:  Goal: Will remain free from falls  Description: Will remain free from falls  Outcome: Ongoing     Problem: Falls - Risk of:  Goal: Absence of physical injury  Description: Absence of physical injury  Outcome: Ongoing
PT eval complete, goal to return to baseline function
Problem: Falls - Risk of:  Goal: Will remain free from falls  Description: Will remain free from falls  6/30/2020 0055 by Christopher Mcneil RN  Outcome: Ongoing  Note: Bed in lowest, locked position, side rails up X3, bed check in place, call light within reach. Instructed pt to call for assistance before getting out of bed, pt verbalized understanding. Avasys in room, will continue to monitor.
Problem: Falls - Risk of:  Goal: Will remain free from falls  Description: Will remain free from falls  Outcome: Ongoing     Problem: Pain:  Goal: Control of acute pain  Description: Control of acute pain  Outcome: Ongoing     Problem: Skin Integrity:  Goal: Demonstration of wound healing without infection will improve  Outcome: Ongoing     Problem: Mobility - Impaired:  Goal: Mobility will improve  Outcome: Ongoing
Problem: Falls - Risk of:  Goal: Will remain free from falls  Description: Will remain free from falls  Outcome: Ongoing  Goal: Absence of physical injury  Description: Absence of physical injury  Outcome: Ongoing     Problem: Pain:  Goal: Pain level will decrease  Description: Pain level will decrease  Outcome: Ongoing  Goal: Control of acute pain  Description: Control of acute pain  Outcome: Ongoing  Goal: Control of chronic pain  Description: Control of chronic pain  Outcome: Ongoing     Problem: Skin Integrity:  Goal: Will show no infection signs and symptoms  Description: Will show no infection signs and symptoms  Outcome: Ongoing  Goal: Absence of new skin breakdown  Description: Absence of new skin breakdown  Outcome: Ongoing     Problem: Nutrition  Goal: Optimal nutrition therapy  6/29/2020 1816 by Estela Luis RN  Outcome: Ongoing  6/29/2020 1303 by Alesha Hutchinson MS, RD, LD  Outcome: Ongoing  Note: Nutrition Problem: Inadequate oral intake  Intervention: Food and/or Nutrient Delivery: Start oral diet, Start ONS(when medically feasible)  Nutritional Goals: Pt will consume greater than 50% of meals and ONS this admission    Bed in lowest position, wheels locked, 2/4 side rails up, nonskid footwear on. Bed/ chair check alarm in place, call light within reach. Pt instructed to call out when needing assistance. Pt stated understanding. Nurse will continue to monitor. Pt scoring pain on 0-10 scale. Pain medications given per MAR. Pt instructed to call out when pain level increasing. Call light within reach. Nurse will continue to reassess and monitor.
Problem: Falls - Risk of:  Goal: Will remain free from falls  Description: Will remain free from falls  Outcome: Ongoing  Goal: Absence of physical injury  Description: Absence of physical injury  Outcome: Ongoing     Problem: Pain:  Goal: Pain level will decrease  Description: Pain level will decrease  Outcome: Ongoing  Goal: Control of acute pain  Description: Control of acute pain  Outcome: Ongoing  Goal: Control of chronic pain  Description: Control of chronic pain  Outcome: Ongoing     Problem: Skin Integrity:  Goal: Will show no infection signs and symptoms  Description: Will show no infection signs and symptoms  Outcome: Ongoing  Goal: Absence of new skin breakdown  Description: Absence of new skin breakdown  Outcome: Ongoing  Goal: Demonstration of wound healing without infection will improve  Outcome: Ongoing     Problem: Nutrition  Goal: Optimal nutrition therapy  Outcome: Ongoing     Problem: Discharge Planning:  Goal: Discharged to appropriate level of care  Outcome: Ongoing     Problem: Mobility - Impaired:  Goal: Mobility will improve  Outcome: Ongoing     Problem: Infection - Surgical Site:  Goal: Will show no infection signs and symptoms  Description: Will show no infection signs and symptoms  Outcome: Ongoing     Problem: Pain - Acute:  Goal: Pain level will decrease  Description: Pain level will decrease  Outcome: Ongoing     Problem: Discharge Planning:  Goal: Discharged to appropriate level of care  Description: Discharged to appropriate level of care  Outcome: Ongoing     Problem: Fluid Volume - Deficit:  Goal: Absence of fluid volume deficit signs and symptoms  Description: Absence of fluid volume deficit signs and symptoms  Outcome: Ongoing     Problem: Nutrition Deficit:  Goal: Ability to achieve adequate nutritional intake will improve  Description: Ability to achieve adequate nutritional intake will improve  Outcome: Ongoing     Problem: Sleep Pattern Disturbance:  Goal: Appears
Problem: Nutrition  Goal: Optimal nutrition therapy  Outcome: Ongoing  Note: Nutrition Problem: Inadequate oral intake  Intervention: Food and/or Nutrient Delivery: Continue current diet, Start ONS  Nutritional Goals: Pt will consume greater than 50% of meals and ONS this admission
Pt bed locked and in lowest position, 2/4 side rails up, call light within reach, fall band and non skid footwear on pt. Bed alarm is on bed and engaged. Pt instructed not to get up without calling the nurse and pt verbalizes understanding. Pt resting comfortably at this time with RR 16. Will continue to assess and monitor.
Pt scoring pain on 0-10 scale. Pain medications given per MAR. Pt instructed to call out when pain level increasing. Call light within reach. Nurse will continue to reassess and monitor.
strength is 5 out of 5 all extremities bilaterally  tone is normal  RIGHT SHOULDER:  redness absent  warmth present  swelling present to shoulder and upper arm  tenderness present to palpation to shoulder and upper arm  range of motion decreased d/t fracture. Pt able to extend wrist, fingers, and thumb. Radial nerve intact. Sensation intact to lateral shoulder, axillary nerve intact  NVI to right UE. Radial pulse palpable. DATA:    CBC:   Recent Labs     06/27/20  2355   WBC 12.8*   HGB 14.3   *     BMP:    Recent Labs     06/27/20  2355   *   K 3.8   CL 94*   CO2 18*   BUN 13   CREATININE 0.6*   GLUCOSE 165*     INR:   Recent Labs     06/27/20  2355   INR 0.94       Radiology:   Right shoulder:   Comminuted fracture of the proximal humerus, with a large displaced fracture   fragment involving the humeral head which is displaced anteriorly at the   glenohumeral joint       IMPRESSION/RECOMMENDATIONS:    Assessment:   Right proximal humerus fracture, displaced    Plan:  1)   Discussed with patient and family about patient's fall and fracture. We discussed recommendation for surgical management for fracture, ORIF vs Reverse TSR. Plan for OR tomorrow. Awaiting repeat etoh level. Diet reordered. NPO P MN, consent, abx ordered. 2)   Labs and imaging reviewed  3)  ED and IM notes reviewed. Awaiting clearance for OR. Concern for etoh withdraw. On CIWA protocol  4)  Ice to shoulder. 5)   Discussed with Dr. Caryn Weir. Full consult to follow. Thank you for the opportunity to consult on this patient.     Daun Epley PA-C

## 2020-07-04 NOTE — CARE COORDINATION
CASE MANAGEMENT DISCHARGE SUMMARY      Discharge to: home with [facility]- SW called and updated Ketty Krishnamurthy at [facility] that Pt is Dcing. Ketty Krishnamurthy reported that she will will pull all needed information off of Epic. She stated that they will not be at the Pt home until Monday for Therapy. Precertification completed: none  Hospital Exemption Notification (HENS) completed: none    New Durable Medical Equipment ordered/agency: none ordered    Transportation:    Family/car: family will pick the Pt up       Confirmed discharge plan with:     Patient: yes, Pt reported that he has already made contact with his family and did not want SW to contact.           RN, name: Bao Weston [place]

## 2020-07-04 NOTE — DISCHARGE INSTR - COC
Continuity of Care Form    Patient Name: Jesika Rhoades   :  1970  MRN:  [id number]    Admit date:  2020  Discharge date:  ***    Code Status Order: Full Code   Advance Directives:   Advance Care Flowsheet Documentation     Date/Time Healthcare Directive Type of Healthcare Directive Copy in [address] Agent's Name Healthcare Agent's Phone Number    20 9939  No, patient does not have an advance directive for healthcare treatment -- -- -- -- --    20 0514  No, patient does not have an advance directive for healthcare treatment -- -- -- -- --          Admitting Physician:  Dylan Denny MD  PCP: No primary care provider on file. Discharging Nurse: [facility] Unit/Room#: 0637/8505-70  Discharging Unit Phone Number: ***    Emergency Contact:   Extended Emergency Contact Information  Primary Emergency Contact: [facility]  Address: [address] [place] [address] Phone: [phone]  Mobile Phone: [phone]  Relation: Spouse  Secondary Emergency Contact: Naga Julien  [place] Phone: [phone]  Mobile Phone: [phone]  Relation: Child    Past Surgical History:  Past Surgical History:   Procedure Laterality Date    NERVE SURGERY Left     PERONEAL NERVE    SHOULDER ARTHROPLASTY Right 2020    RIGHT REVERSE SHOULDER ARTHROPLASTY performed by Jacoby Crowder DO at [facility]       Immunization History: There is no immunization history on file for this patient.     Active Problems:  Patient Active Problem List   Diagnosis Code    Tobacco use Z72.0    Alcohol withdrawal ([address] F10.239    Moderate alcohol withdrawal without perceptual disturbances with delirium (HCC) F10.231    Closed fracture of proximal end of humerus S42.209A    ETOH abuse F10.10    Humeral head fracture, right, closed, initial encounter S42.291A    Alcoholism ([address] F10.20       Isolation/Infection: Isolation          No Isolation        Patient Infection Status     None to display          Nurse Assessment:  Last Vital Signs: /73   Pulse 99   Temp 98.3 °F (36.8 °C) (Oral)   Resp 14   Ht 6' 3\" (1.905 m)   Wt 160 lb (72.6 kg)   SpO2 95%   BMI 20.00 kg/m²     Last documented pain score (0-10 scale): Pain Level: 2  Last Weight:   Wt Readings from Last 1 Encounters:   20 160 lb (72.6 kg)     Mental Status:  {IP PT MENTAL STATUS:}    IV Access:  { JACQUELINE IV ACCESS:016564136}    Nursing Mobility/ADLs:  Walking   {CHP DME BUYW:779411990}  Transfer  {CHP DME CZUX:431187644}  Bathing  {CHP DME MVWP:553630050}  Dressing  {CHP DME XEPQ:357676602}  Toileting  {CHP DME DWNQ:624150642}  Feeding  {CHP DME BLW}  Med Admin  {P DME BFTD:392578460}  Med Delivery   { JACQUELINE MED Delivery:921977350}    Wound Care Documentation and Therapy:        Elimination:  Continence:   · Bowel: {YES / BS:07669}  · Bladder: {YES / YL:67447}  Urinary Catheter: {Urinary Catheter:437701111}   Colostomy/Ileostomy/Ileal Conduit: {YES / JX:71794}       Date of Last BM: ***    Intake/Output Summary (Last 24 hours) at 2020 1056  Last data filed at 2020 0355  Gross per 24 hour   Intake 240 ml   Output 1525 ml   Net -1285 ml     I/O last 3 completed shifts:   In: 18 [P.O.:480]  Out: 1800 [Urine:1800]    Safety Concerns:     [address] [employer] Safety Concerns:319409481}    Impairments/Disabilities:      [employer] Impairments/Disabilities:052292895}    Nutrition Therapy:  Current Nutrition Therapy:   [employer] Diet List:771828557}    Routes of Feeding: {CHP DME Other Feedings:270421217}  Liquids: {Slp liquid thickness:85960}  Daily Fluid Restriction: {CHP DME Yes amt example:706477888}  Last Modified Barium Swallow with Video (Video Swallowing Test): {Done Not Done [facility]:978557624}    Treatments at the Time of Hospital Discharge:   Respiratory Treatments: ***  Oxygen Therapy:  {Therapy; copd oxygen:61502}  Ventilator:    { CC Vent [facility]:405633748}    Rehab Therapies: {THERAPEUTIC INTERVENTION:4489831498}  Weight Bearing Status/Restrictions: {MH CC Weight Bearin}  Other Medical Equipment (for information only, NOT a DME order):  {EQUIPMENT:833693259}  Other Treatments: ***    Patient's personal belongings (please select all that are sent with patient):  {CHP DME Belongings:241023435}    RN SIGNATURE:  {Esignature:727410425}    CASE MANAGEMENT/SOCIAL WORK SECTION    Inpatient Status Date: ***    Readmission Risk Assessment Score:  Readmission Risk              Risk of Unplanned Readmission:        8           Discharging to Facility/ Agency   · Name: [facility]   · Phone:[phone]  · [phone]    ·     / signature: Electronically signed by GRACE Bird on 20 at 11:33 AM EDT    PHYSICIAN SECTION    Prognosis: Good    Condition at Discharge: Stable    Recommended Labs or Other Treatments After Discharge: Home PT/OT    Physician Certification: I certify the above information and transfer of Caty Smalls  is necessary for the continuing treatment of the diagnosis listed and that he requires [address] for less 30 days.      Update Admission H&P: No change in H&P    PHYSICIAN SIGNATURE:  Electronically signed by DANY Cortes CNP on 20 at 10:56 AM EDT

## 2020-07-07 ENCOUNTER — TELEPHONE (OUTPATIENT)
Dept: ORTHOPEDIC SURGERY | Age: 50
End: 2020-07-07

## 2020-07-08 NOTE — ADT AUTH CERT
Musculoskeletal Disease [facility] - Care Day 3 (6/30/2020) by Ollie Grigsby RN         Review Status Review Entered   Completed 7/8/2020 14:44       Criteria Review      Care Day: 3 Care Date: 6/30/2020 Level of Care:    Guideline Day 3    Level Of Care    ( ) * Activity level acceptable    ( ) * Complete discharge planning    Clinical Status    ( ) * Temperature status acceptable    ( ) * No infection, or status acceptable    ( ) * C-reactive protein stable, declining, or not indicated    ( ) * Respiratory status acceptable    ( ) * Neurologic status acceptable    ( ) * Vascular, soft tissue, and wound status acceptable    ( ) * Pain and nausea absent or adequately managed    ( ) * Fracture or injury absent or status acceptable    ( ) * No bone and joint infection, or status acceptable    ( ) * Rheumatologic and vasculitic status acceptable    ( ) * General Discharge Criteria met    Interventions    ( ) * Intake acceptable    ( ) * No inpatient interventions needed    * Milestone   Additional Notes   7/3   Attending MD:   Sitting up on the edge of the bed.  Seems tremulous.        /74   Pulse 86   Temp 97.7 °F (36.5 °C) (Oral)   Resp 16   Ht 6' 3\" (1.905 m)   Wt 160 lb (72.6 kg)   SpO2 96%   BMI 20.00 kg/m²       Acute comminuted fx of proximal humerus - initial encounter   Ortho consult.  S/p surgery done on 6/29/2020. Antiemetic, pain management   Continue pain management, wound care and therapy as per surgery.       ETOH abuse   WA protocol   Continue supportive care, monitor.    Continue scheduled Librium taper.       Tobacco abuse   Nicotine patch        DVT Prophylaxis: Lovenox   Diet: DIET GENERAL;   Dietary Nutrition Supplements: Standard High Calorie Oral Supplement   Code Status: Full Code       PT/OT Eval Status:  ordered      Ortho:   Post operative day 4 status post right total reverse total shoulder arthroplasty for fracture.       1: ETOH withdrawal being managed by medicine   2:  D/C Plan: Additional Notes   6/28-       Dx- rt humerus fracture, alcohol abuse- poss sahy      Presents to ed w rt shoulder/arm pain, fell 2 days ago- seen in ed earlier- + fracture was noted but he left ama, he is an alcoholic--- family states they \"bribed him with vodka\" to come back and stay, he does c/o rt shoulder pain, worse w movement, he drank vodka just prior to coming to ed, he feels tremulous and nauseated   Concern for alcohol withdrawal as he is hypertensive, tachycardic, tremulous.  Started on benzodiazepines      145/95 98.1 °F (36.7 °C) Oral 129 20 94 % -- --           Physical Exam       General appearance:  Cooperative.  No acute distress. Skin:  Warm. Dry. Eye:  Extraocular movements intact.      Ears, nose, mouth and throat:  Oral mucosa moist,   Neck:  Trachea midline.            Heart: Tachycardic but regular   Perfusion:  intact   Respiratory:  Lungs clear to auscultation bilaterally.  Respirations nonlabored.      Abdominal:   Non distended.  Nontender   Neurological:  Alert and oriented x 3.  Moves all extremities spontaneously.  Intact sensation throughout the entire right upper extremity including axillary distribution of the right arm.  Resting tremor present.    Musculoskeletal:   Right shoulder deformity with inability to range right shoulder secondary to pain.  Normal flexion extension of right elbow.  Normal range of motion of the right wrist.           Psychiatric:  Normal mood      Shoulder xr-    Comminuted fracture of the proximal humerus, with a large displaced fracture   fragment involving the humeral head which is displaced anteriorly at the   glenohumeral joint       Labs-    Na 133, anion gap 21, glucose 165, bun 13, creat 0.6, alt 61, ast 124   Ethanol 305   Wbc 12.8, h/h 14/41      In ed, given dilaudid 1 iv, mso4 4 iv, ativan 2 po , ativan 3 po x 2, nicoderm patch applied, started banan bag fluids- folic acid/mvi/thiamine      Admit for ongoing trt-       Per h+p- pmhx of etoh abuse who presents to [facility] today with fall and R shoulder pain that occurred two days ago evening. He reports he was out hiking with son and he fell on his R shoulder. He has been having severe pain and came to ER. Denies trauma to head. Denies being on anticoagulation. He is tremulous and admits to hx of DTs.  He denies DT related seizures in past.            PLAN:       Acute comminuted fx of proximal humerus - initial encounter   Ortho consult   NPO   Antiemetic, pain management       ETOH abuse   CIWA protocol   Monitor        Tobacco abuse   Nicotine patch        DVT Prophylaxis: SCD, holding off lovenox until ortho consult   Diet: Diet NPO Effective Now Exceptions are: Sips with Meds   Code Status: Full Code       PT/OT Eval Status: pending ortho consult      Npo, ortho consult   Seizure/fall precautions   Ciwa protocal

## 2020-07-13 ENCOUNTER — OFFICE VISIT (OUTPATIENT)
Dept: ORTHOPEDIC SURGERY | Age: 50
End: 2020-07-13
Payer: COMMERCIAL

## 2020-07-13 VITALS — HEIGHT: 75 IN | WEIGHT: 150 LBS | BODY MASS INDEX: 18.65 KG/M2

## 2020-07-13 PROBLEM — Z96.611 S/P REVERSE TOTAL SHOULDER ARTHROPLASTY, RIGHT: Status: ACTIVE | Noted: 2020-07-13

## 2020-07-13 PROCEDURE — L3660 SO 8 AB RSTR CAN/WEB PRE OTS: HCPCS | Performed by: ORTHOPAEDIC SURGERY

## 2020-07-13 PROCEDURE — 99024 POSTOP FOLLOW-UP VISIT: CPT | Performed by: ORTHOPAEDIC SURGERY

## 2020-07-13 NOTE — DISCHARGE SUMMARY
Hospital Medicine Discharge Summary    Patient ID: Riana Lazo      Patient's PCP: No primary care provider on file. Admit Date: 6/27/2020     Discharge Date: 7/4/2020      Admitting Physician: Maynor Guevara MD     Discharge Physician: DANY Mason - CNP     Discharge Diagnoses: Active Hospital Problems    Diagnosis    [address]ism [address] [F10.20]    Closed fracture of proximal end of humerus [S42.209A]    ETOH abuse [F10.10]    Humeral head fracture, right, closed, initial encounter Ary Rivera       The patient was seen and examined on day of discharge and this discharge summary is in conjunction with any daily progress note from day of discharge. Hospital Course:     Mr. Darnell jeff is a 47 yo with pmhx of etoh abuse who presents to [facility] today with fall and R shoulder pain that occurred two days ago evening. He reports he was out hiking with son and he fell on his R shoulder. He has been having severe pain and came to ER. Denies trauma to head. Denies being on anticoagulation. He is tremulous and admits to hx of DTs. He denies DT related seizures in past.        Acute comminuted fx of proximal humerus - initial encounter  Ortho consulted. S/p reverse shoulder arthroplasty for proximal humerus fracture on 6/29/2020.     ETOH abuse  Continue scheduled Librium taper at discharge. Family planning to stage intervention upon dc.     Tobacco abuse  Nicotine patch       Physical Exam Performed:     /73   Pulse 99   Temp 98.3 °F (36.8 °C) (Oral)   Resp 14   Ht 6' 3\" (1.905 m)   Wt 160 lb (72.6 kg)   SpO2 95%   BMI 20.00 kg/m²       General appearance:  No apparent distress, appears stated age and cooperative. HEENT:  Normal cephalic, atraumatic without obvious deformity. Pupils equal, round, and reactive to light. Extra ocular muscles intact. Conjunctivae/corneas clear. Neck: Supple, with full range of motion. No jugular venous distention.  Trachea midline. Respiratory:  Normal respiratory effort. Clear to auscultation, bilaterally without Rales/Wheezes/Rhonchi. Cardiovascular:  Regular rate and rhythm with normal S1/S2 without murmurs, rubs or gallops. Abdomen: Soft, non-tender, non-distended with normal bowel sounds. Musculoskeletal:  No clubbing, cyanosis or edema bilaterally. Right arm in sling. Skin: Skin color, texture, turgor normal.  No rashes or lesions. Neurologic:  Neurovascularly intact without any focal sensory/motor deficits. Cranial nerves: II-XII intact, grossly non-focal.  Psychiatric:  Alert and oriented, thought content appropriate, normal insight  Capillary Refill: Brisk,< 3 seconds   Peripheral Pulses: +2 palpable, equal bilaterally       Labs: For convenience and continuity at follow-up the following most recent labs are provided:      CBC:    Lab Results   Component Value Date    WBC 9.0 06/30/2020    HGB 8.0 06/30/2020    HCT 22.8 06/30/2020    PLT 72 06/30/2020       Renal:    Lab Results   Component Value Date     07/01/2020    K 3.3 07/01/2020    CL 97 07/01/2020    CO2 25 07/01/2020    BUN 9 07/01/2020    CREATININE <0.5 07/01/2020    CALCIUM 8.2 07/01/2020    PHOS 2.1 06/30/2020         Significant Diagnostic Studies    Radiology:   XR CHEST PORTABLE   Final Result   Left basilar atelectasis versus airspace disease. XR SHOULDER RIGHT (MIN 2 VIEWS)   Final Result   Intraprocedural fluoroscopic spot images as above. See separate procedure   report for more information. FLUORO FOR SURGICAL PROCEDURES    (Results Pending)          Consults:     IP CONSULT TO SOCIAL WORK  IP CONSULT TO HOSPITALIST  IP CONSULT TO ORTHOPEDIC SURGERY  IP CONSULT TO SOCIAL WORK  IP CONSULT TO PULMONOLOGY  IP CONSULT TO HOME CARE NEEDS    Disposition:   Home with home health    Condition at Discharge: Stable    Discharge Instructions/Follow-up:  F/u with PCP in 1-2 weeks. [facility]   Recommend outpatient alcohol abuse counseling. Code Status:  Full    Activity: activity as tolerated    Diet: regular diet      Discharge Medications:     Discharge Medication List as of 7/4/2020 10:59 AM           Details   chlordiazePOXIDE (LIBRIUM) 25 MG capsule Take 1 capsule by mouth nightly for 3 days. , Disp-3 capsule, R-0Print             Time Spent on discharge is more than 30 minutes in the examination, evaluation, counseling and review of medications and discharge plan. Signed:    DANY Schwartz CNP   7/13/2020      Thank you No primary care provider on file. for the opportunity to be involved in this patient's care. If you have any questions or concerns please feel free to contact me at [phone].

## 2020-07-13 NOTE — PROGRESS NOTES
HISTORY OF PRESENT ILLNESS: The patient returns today for the first postoperative visit after right reverse total shoulder arthroplasty for fracture. Pain control has been satisfactory with oral medications. There have been no fevers or chills. Home exercises have been performed as instructed. Is here today with his sister who is visiting from [place]. Date of surgery: June 29, 2020    PHYSICAL EXAMINATION: Inspection of the affected right shoulder reveals expected swelling. The incision is clean and dry. The skin is warm. Range of motion is limited by pain and swelling as expected. The distal neurovascular exam is grossly intact, does have slight numbness in the axillary nerve distribution and along the pinky finger. Patient is able to fully extend the wrist, give a thumbs up and cross his fingers. X-RAYS:  True AP and axillary views of the right shoulder reveal appropriately placed, well located reverse total shoulder arthroplasty components, there has been some displacement at the greater tuberosity fracture. Diagnosis Orders   1. S/P shoulder replacement, right  XR SHOULDER RIGHT (MIN 2 VIEWS)    OSR PT - [facility]    Bre DLX Shoulder Immobilizer   2. S/P reverse total shoulder arthroplasty, right  OS[facility] PT [facility]         ASSESSMENT/PLAN: Doing well after right reverse total shoulder arthroplasty. The UltraSling will be minimized over the next 2-4 weeks. I reviewed  the standard postoperative exercises and provided them with a prescription for physical therapy is currently receiving home health care. Discussed the numbness in the axillary nerve distribution, this should improve over the next few months. I have recommended ice and judicious use of narcotics as required. They will follow up in approximately six weeks for repeat evaluation, at that time I will do AP and axillary x-ray of the right shoulder.   Patient and his sister agrees with this plan, all of their questions were answered best of our ability and to their satisfaction.

## 2020-10-19 ENCOUNTER — OFFICE VISIT (OUTPATIENT)
Dept: ORTHOPEDIC SURGERY | Age: 50
End: 2020-10-19
Payer: COMMERCIAL

## 2020-10-19 VITALS — HEIGHT: 75 IN | WEIGHT: 150 LBS | BODY MASS INDEX: 18.65 KG/M2

## 2020-10-19 PROCEDURE — 99213 OFFICE O/P EST LOW 20 MIN: CPT | Performed by: ORTHOPAEDIC SURGERY

## 2020-10-19 PROCEDURE — G8420 CALC BMI NORM PARAMETERS: HCPCS | Performed by: ORTHOPAEDIC SURGERY

## 2020-10-19 PROCEDURE — 3017F COLORECTAL CA SCREEN DOC REV: CPT | Performed by: ORTHOPAEDIC SURGERY

## 2020-10-19 PROCEDURE — G8484 FLU IMMUNIZE NO ADMIN: HCPCS | Performed by: ORTHOPAEDIC SURGERY

## 2020-10-19 PROCEDURE — 4004F PT TOBACCO SCREEN RCVD TLK: CPT | Performed by: ORTHOPAEDIC SURGERY

## 2020-10-19 PROCEDURE — G8427 DOCREV CUR MEDS BY ELIG CLIN: HCPCS | Performed by: ORTHOPAEDIC SURGERY

## 2020-10-19 NOTE — PROGRESS NOTES
HISTORY OF PRESENT ILLNESS: The patient returns today for the second postoperative visit after right reverse total shoulder arthroplasty for fracture. Patient left immediately after surgery to a rehab facility in [place]. He completed 9 physical therapy visits at the facility. States his shoulder was working fairly well until 2 weeks ago when he sustained a new injury bracing go-carts. 2/10 pain    DOS: June 29, 2020     ]      PHYSICAL EXAMINATION: Inspection of the affected right shoulder reveals that the incision is healed. The skin is warm. The deltoid shows significant atrophy, he has numbness decreased sensation over the axillary nerve distribution patient states this is improving from surgery. The distal neurovascular exam is grossly intact. Range of motion reveals he is able to reach his hand to his mouth and has 160 degrees of passive forward flexion. Active forward flexion to approximately 90 degrees with shoulder hike     Diagnosis Orders   1. S/P reverse total shoulder arthroplasty, right  OSR PT [facility]         X-RAYS: 2 views of the right shoulder are obtained and reviewed today show appropriately positioned reverse shoulder arthroplasty    ASSESSMENT/PLAN: Status post right reverse total shoulder arthroplasty for fracture with axillary neuropathy. We discussed axillary neuropathy can take 9 months to a year for improvement. He will begin physical therapy at our [facility] office. I'll reevaluate in in 2 months and repeat true AP and x-rays of the shoulder.

## 2024-10-15 ENCOUNTER — HOSPITAL ENCOUNTER (OUTPATIENT)
Dept: MRI IMAGING | Age: 54
Discharge: HOME | End: 2024-10-15
Payer: COMMERCIAL

## 2024-10-15 DIAGNOSIS — R51.9 BILATERAL HEADACHES: ICD-10-CM

## 2024-10-15 PROCEDURE — 6360000004 HC RX CONTRAST MEDICATION: Performed by: NURSE PRACTITIONER

## 2024-10-15 PROCEDURE — A9579 GAD-BASE MR CONTRAST NOS,1ML: HCPCS | Performed by: NURSE PRACTITIONER

## 2024-10-15 PROCEDURE — 70553 MRI BRAIN STEM W/O & W/DYE: CPT

## 2024-10-15 RX ADMIN — GADOTERIDOL 15 ML: 279.3 INJECTION, SOLUTION INTRAVENOUS at 10:35

## (undated) DEVICE — GAUZE,SPONGE,2"X2",8PLY,STERILE,LF,2'S: Brand: MEDLINE

## (undated) DEVICE — 3 BONE CEMENT MIXER: Brand: MIXEVAC

## (undated) DEVICE — SUTURE ETHBND EXCEL SZ 2 L30IN NONABSORBABLE GRN L40MM V-37 MX69G

## (undated) DEVICE — SYRINGE MED 10ML LUERLOCK TIP W/O SFTY DISP

## (undated) DEVICE — OPTIFOAM GENTLE SA, POSTOP, 4X12: Brand: MEDLINE

## (undated) DEVICE — 3M™ STERI-DRAPE™ INSTRUMENT POUCH 1018: Brand: STERI-DRAPE™

## (undated) DEVICE — GLOVE,SURG,TRIUMPH MICRO,LTX,PF,7.5: Brand: MEDLINE

## (undated) DEVICE — GAUZE,SPONGE,4"X4",8PLY,STRL,LF,10/TRAY: Brand: MEDLINE

## (undated) DEVICE — DRILL BITT: Brand: REUNION

## (undated) DEVICE — NITINOL PILOT WIRE: Brand: REUNION

## (undated) DEVICE — KIT SHLDR STBL MARCO FOR SPIDER LIMB POS

## (undated) DEVICE — Device

## (undated) DEVICE — SYSTEM SKIN CLSR 22CM DERMBND PRINEO

## (undated) DEVICE — SUTURE MCRYL SZ 0 L18IN ABSRB VLT L36MM CT-1 1/2 CIR Y740D

## (undated) DEVICE — SPONGE LAP W18XL18IN WHT COT 4 PLY FLD STRUNG RADPQ DISP ST

## (undated) DEVICE — HEWSON SUTURE RETRIEVER: Brand: HEWSON SUTURE RETRIEVER

## (undated) DEVICE — ORTHOSES THERMOPLASTIC ACROMIOCLAVICULAR SHLDR PREFABRICATED

## (undated) DEVICE — STERILE LATEX POWDER-FREE SURGICAL GLOVESWITH NITRILE COATING: Brand: PROTEXIS

## (undated) DEVICE — 3M™ TEGADERM™ TRANSPARENT FILM DRESSING FRAME STYLE, 1627, 4 IN X 10 IN (10 CM X 25 CM), 20/CT 4CT/CASE: Brand: 3M™ TEGADERM™

## (undated) DEVICE — DRILL BIT 2.4MM (3/32'') X 128.0MM

## (undated) DEVICE — SUTURE MCRYL SZ 2-0 L18IN ABSRB VLT L36MM CT-1 1/2 CIR Y739D